# Patient Record
Sex: FEMALE | Race: WHITE | NOT HISPANIC OR LATINO | Employment: FULL TIME
[De-identification: names, ages, dates, MRNs, and addresses within clinical notes are randomized per-mention and may not be internally consistent; named-entity substitution may affect disease eponyms.]

---

## 2017-08-26 ENCOUNTER — HEALTH MAINTENANCE LETTER (OUTPATIENT)
Age: 27
End: 2017-08-26

## 2019-02-04 ENCOUNTER — RECORDS - HEALTHEAST (OUTPATIENT)
Dept: ADMINISTRATIVE | Facility: OTHER | Age: 29
End: 2019-02-04

## 2021-06-02 VITALS — WEIGHT: 160 LBS | BODY MASS INDEX: 25.82 KG/M2

## 2021-06-02 VITALS — BODY MASS INDEX: 25.82 KG/M2 | HEIGHT: 66 IN

## 2022-02-14 ENCOUNTER — APPOINTMENT (OUTPATIENT)
Dept: ULTRASOUND IMAGING | Facility: CLINIC | Age: 32
End: 2022-02-14
Attending: EMERGENCY MEDICINE
Payer: COMMERCIAL

## 2022-02-14 ENCOUNTER — HOSPITAL ENCOUNTER (EMERGENCY)
Facility: CLINIC | Age: 32
Discharge: HOME | End: 2022-02-14
Attending: EMERGENCY MEDICINE | Admitting: EMERGENCY MEDICINE
Payer: COMMERCIAL

## 2022-02-14 VITALS
WEIGHT: 170 LBS | RESPIRATION RATE: 16 BRPM | TEMPERATURE: 98.1 F | HEIGHT: 66 IN | SYSTOLIC BLOOD PRESSURE: 112 MMHG | DIASTOLIC BLOOD PRESSURE: 61 MMHG | OXYGEN SATURATION: 100 % | HEART RATE: 85 BPM | BODY MASS INDEX: 27.32 KG/M2

## 2022-02-14 DIAGNOSIS — O26.859 SPOTTING IN EARLY PREGNANCY: ICD-10-CM

## 2022-02-14 DIAGNOSIS — N39.0 URINARY TRACT INFECTION WITHOUT HEMATURIA, SITE UNSPECIFIED: ICD-10-CM

## 2022-02-14 LAB
ABO/RH(D): NORMAL
ALBUMIN SERPL-MCNC: 4 G/DL (ref 3.5–5)
ALBUMIN UR-MCNC: NEGATIVE MG/DL
ALP SERPL-CCNC: 54 U/L (ref 45–120)
ALT SERPL W P-5'-P-CCNC: 12 U/L (ref 0–45)
ANION GAP SERPL CALCULATED.3IONS-SCNC: 7 MMOL/L (ref 5–18)
APPEARANCE UR: CLEAR
AST SERPL W P-5'-P-CCNC: 14 U/L (ref 0–40)
BACTERIA #/AREA URNS HPF: ABNORMAL /HPF
BILIRUB DIRECT SERPL-MCNC: 0.1 MG/DL
BILIRUB SERPL-MCNC: 0.4 MG/DL (ref 0–1)
BILIRUB UR QL STRIP: NEGATIVE
BUN SERPL-MCNC: 3 MG/DL (ref 8–22)
CALCIUM SERPL-MCNC: 8.8 MG/DL (ref 8.5–10.5)
CHLORIDE BLD-SCNC: 107 MMOL/L (ref 98–107)
CO2 SERPL-SCNC: 22 MMOL/L (ref 22–31)
COLOR UR AUTO: ABNORMAL
CREAT SERPL-MCNC: 0.6 MG/DL (ref 0.6–1.1)
ERYTHROCYTE [DISTWIDTH] IN BLOOD BY AUTOMATED COUNT: 12.8 % (ref 10–15)
GFR SERPL CREATININE-BSD FRML MDRD: >90 ML/MIN/1.73M2
GLUCOSE BLD-MCNC: 84 MG/DL (ref 70–125)
GLUCOSE UR STRIP-MCNC: NEGATIVE MG/DL
HCG SERPL-ACNC: ABNORMAL MLU/ML (ref 0–4)
HCT VFR BLD AUTO: 36.7 % (ref 35–47)
HGB BLD-MCNC: 12.5 G/DL (ref 11.7–15.7)
HGB UR QL STRIP: ABNORMAL
KETONES UR STRIP-MCNC: NEGATIVE MG/DL
LEUKOCYTE ESTERASE UR QL STRIP: ABNORMAL
LIPASE SERPL-CCNC: 11 U/L (ref 0–52)
MCH RBC QN AUTO: 29.2 PG (ref 26.5–33)
MCHC RBC AUTO-ENTMCNC: 34.1 G/DL (ref 31.5–36.5)
MCV RBC AUTO: 86 FL (ref 78–100)
MUCOUS THREADS #/AREA URNS LPF: PRESENT /LPF
NITRATE UR QL: NEGATIVE
PH UR STRIP: 7 [PH] (ref 5–7)
PLATELET # BLD AUTO: 227 10E3/UL (ref 150–450)
POTASSIUM BLD-SCNC: 3.8 MMOL/L (ref 3.5–5)
PROT SERPL-MCNC: 7.6 G/DL (ref 6–8)
RBC # BLD AUTO: 4.28 10E6/UL (ref 3.8–5.2)
RBC URINE: 1 /HPF
SODIUM SERPL-SCNC: 136 MMOL/L (ref 136–145)
SP GR UR STRIP: 1.01 (ref 1–1.03)
SPECIMEN EXPIRATION DATE: NORMAL
SQUAMOUS EPITHELIAL: 6 /HPF
UROBILINOGEN UR STRIP-MCNC: 2 MG/DL
WBC # BLD AUTO: 8 10E3/UL (ref 4–11)
WBC URINE: 10 /HPF

## 2022-02-14 PROCEDURE — 86901 BLOOD TYPING SEROLOGIC RH(D): CPT | Performed by: EMERGENCY MEDICINE

## 2022-02-14 PROCEDURE — 83690 ASSAY OF LIPASE: CPT | Performed by: EMERGENCY MEDICINE

## 2022-02-14 PROCEDURE — 85027 COMPLETE CBC AUTOMATED: CPT | Performed by: EMERGENCY MEDICINE

## 2022-02-14 PROCEDURE — 84702 CHORIONIC GONADOTROPIN TEST: CPT | Performed by: EMERGENCY MEDICINE

## 2022-02-14 PROCEDURE — 81001 URINALYSIS AUTO W/SCOPE: CPT | Performed by: EMERGENCY MEDICINE

## 2022-02-14 PROCEDURE — 99284 EMERGENCY DEPT VISIT MOD MDM: CPT | Mod: 25

## 2022-02-14 PROCEDURE — 80053 COMPREHEN METABOLIC PANEL: CPT | Performed by: EMERGENCY MEDICINE

## 2022-02-14 PROCEDURE — 76801 OB US < 14 WKS SINGLE FETUS: CPT

## 2022-02-14 PROCEDURE — 36415 COLL VENOUS BLD VENIPUNCTURE: CPT | Performed by: EMERGENCY MEDICINE

## 2022-02-14 PROCEDURE — 82248 BILIRUBIN DIRECT: CPT | Performed by: EMERGENCY MEDICINE

## 2022-02-14 RX ORDER — NITROFURANTOIN 25; 75 MG/1; MG/1
100 CAPSULE ORAL 2 TIMES DAILY
Qty: 14 CAPSULE | Refills: 0 | Status: ON HOLD | OUTPATIENT
Start: 2022-02-14 | End: 2022-06-10

## 2022-02-14 ASSESSMENT — ENCOUNTER SYMPTOMS
DIZZINESS: 0
ABDOMINAL PAIN: 1
JOINT SWELLING: 0
VOMITING: 0
SHORTNESS OF BREATH: 0
CONFUSION: 0
FEVER: 0
SORE THROAT: 0
CHILLS: 0
DYSURIA: 0
FLANK PAIN: 1
HEMATURIA: 0
NAUSEA: 0
DIARRHEA: 0
APPETITE CHANGE: 0

## 2022-02-14 ASSESSMENT — MIFFLIN-ST. JEOR: SCORE: 1507.86

## 2022-02-14 NOTE — ED TRIAGE NOTES
The patient presents to the ED with light vaginal bleeding and pressure in her left flank that radiates around to her front. The patient is 11 weeks pregnant. The patient is . The patient reports a healthy pregnancy up until this point.

## 2022-02-14 NOTE — ED PROVIDER NOTES
Emergency Department Encounter     Evaluation Date & Time:   2022  5:06 PM    CHIEF COMPLAINT:  Vaginal Bleeding      Triage Note:The patient presents to the ED with light vaginal bleeding and pressure in her left flank that radiates around to her front. The patient is 11 weeks pregnant. The patient is . The patient reports a healthy pregnancy up until this point.           ED COURSE & MEDICAL DECISION MAKING:     ED Course as of 22   Mon 2022   1824 Rh+, no indication for rhogam.  Serum labs unremarkable.  UA with possible infection, would start on antibx given pregnant state.  Do not feel this is pyelonephritis.     Pt is  with 2 previous ectopic pregnancies and 1 miscarriage, here with brief, light pink vaginal spotting that she woke up to today. No ongoing bleeding. Does report some recent intermittent right flank/RUQ pain, but none currently and denies any dysuria, hematuria/frequency. She is afebrile, well appearing, but a little tender right flank.  US already performed and shows normal IUP.  Will get labs and anticipte discharge with follow up with her obgyn Wednesday, which she already has scheduled. Pt agreeable.      5:11 PM I met with the patient, obtained history, performed an initial exam, and discussed options and plan for diagnostics and treatment here in the ED. PPE worn including N95 mask, surgical gloves.  6:40 PM I rechecked the patient and updated her on imaging and lab results. Discussed plans for discharge.    At the conclusion of the encounter I discussed the results of all the tests and the disposition. The questions were answered. The patient or family acknowledged understanding and was agreeable with the care plan.      MEDICATIONS GIVEN IN THE EMERGENCY DEPARTMENT:  Medications - No data to display    NEW PRESCRIPTIONS STARTED AT TODAY'S ED VISIT:  Discharge Medication List as of 2022  6:42 PM      START taking these medications    Details    nitroFURantoin macrocrystal-monohydrate (MACROBID) 100 MG capsule Take 1 capsule (100 mg) by mouth 2 times daily, Disp-14 capsule, R-0, Local Print             HPI   History obtained from: Patient      Marine Andrews is a 32 year old female with a pertinent history of currently 11 weeks pregnant, previous miscarriage (x1), previous ectopic pregnancies (x2) who presents to this ED by walk in for evaluation of vaginal bleeding, flank pain. Patient reports waking up this morning and noticing bright pink blood on her drawers, so she put on a pad. Some time after she noticed her vaginal bleeding she reports developing right flank pressure which wraps around to her left abdomen. This pain has since resolved. Her pain is not provoked with eating. Patient reports she is eating and drinking normally.    Denies dysuria, hematuria, vomiting, fever, cough, or any other symptoms at this time. No concern for STD exposure.    Per chart review, patient had an OB Ultrasound on 01/26/2022 which found a normal intrauterine viable pregnancy at 8 weeks.    REVIEW OF SYSTEMS:  Review of Systems   Constitutional: Negative for appetite change, chills and fever.   HENT: Negative for sore throat.    Eyes: Negative for visual disturbance.   Respiratory: Negative for shortness of breath.    Cardiovascular: Negative for chest pain.   Gastrointestinal: Positive for abdominal pain (right sided, resolved). Negative for diarrhea, nausea and vomiting.   Endocrine: Negative for polyuria.   Genitourinary: Positive for flank pain (right sided, resolved) and vaginal bleeding (light). Negative for dysuria and hematuria.        - urinary changes     Musculoskeletal: Negative for joint swelling.   Skin: Negative for rash.   Neurological: Negative for dizziness.   Psychiatric/Behavioral: Negative for confusion.   All other systems reviewed and are negative.        Medical History   No past medical history on file.    Past Surgical History:   Procedure  "Laterality Date     TUBAL LIGATION         Family History   Problem Relation Age of Onset     Family History Negative Mother      Family History Negative Father      Family History Negative Maternal Grandmother      Family History Negative Maternal Grandfather      Cancer Paternal Grandmother         Esophageal cancer     Family History Negative Paternal Grandfather      Ulcers Mother      Ulcers Maternal Grandmother        Social History     Tobacco Use     Smoking status: Never Smoker     Smokeless tobacco: Not on file     Tobacco comment: dad smokes   Substance Use Topics     Alcohol use: No     Drug use: No       nitroFURantoin macrocrystal-monohydrate (MACROBID) 100 MG capsule  NO ACTIVE MEDICATIONS        Physical Exam     Vitals:  /61   Pulse 85   Temp 98.1  F (36.7  C) (Temporal)   Resp 16   Ht 1.676 m (5' 6\")   Wt 77.1 kg (170 lb)   LMP 11/25/2021   SpO2 100%   BMI 27.44 kg/m      PHYSICAL EXAM:   Physical Exam  Vitals and nursing note reviewed.   Constitutional:       General: She is not in acute distress.     Appearance: Normal appearance.   HENT:      Head: Normocephalic and atraumatic.      Nose: Nose normal.      Mouth/Throat:      Mouth: Mucous membranes are moist.   Eyes:      Pupils: Pupils are equal, round, and reactive to light.   Cardiovascular:      Rate and Rhythm: Normal rate and regular rhythm.      Pulses: Normal pulses.           Radial pulses are 2+ on the right side and 2+ on the left side.        Dorsalis pedis pulses are 2+ on the right side and 2+ on the left side.   Pulmonary:      Effort: Pulmonary effort is normal. No respiratory distress.      Breath sounds: Normal breath sounds.   Abdominal:      Palpations: Abdomen is soft.      Tenderness: There is no abdominal tenderness. Negative signs include McBurney's sign.      Comments: Tenderness to right flank, no rash.   Musculoskeletal:      Cervical back: Full passive range of motion without pain and neck supple.      " Comments: No calf tenderness or swelling b/l. No spinal tenderness.   Skin:     General: Skin is warm.      Findings: No rash.   Neurological:      General: No focal deficit present.      Mental Status: She is alert. Mental status is at baseline.      Comments: Fluent speech, no acute lateralizing deficits   Psychiatric:         Mood and Affect: Mood normal.         Behavior: Behavior normal.         Results     LAB:  All pertinent labs reviewed and interpreted  Labs Ordered and Resulted from Time of ED Arrival to Time of ED Departure   HCG QUANTITATIVE PREGNANCY - Abnormal       Result Value    hCG Quantitative 75,259 (*)    ROUTINE UA WITH MICROSCOPIC REFLEX TO CULTURE - Abnormal    Color Urine Light Yellow      Appearance Urine Clear      Glucose Urine Negative      Bilirubin Urine Negative      Ketones Urine Negative      Specific Gravity Urine 1.013      Blood Urine 0.5 mg/dL (*)     pH Urine 7.0      Protein Albumin Urine Negative      Urobilinogen Urine 2.0 (*)     Nitrite Urine Negative      Leukocyte Esterase Urine 75 Olga/uL (*)     Bacteria Urine Moderate (*)     Mucus Urine Present (*)     RBC Urine 1      WBC Urine 10 (*)     Squamous Epithelials Urine 6 (*)    BASIC METABOLIC PANEL - Abnormal    Sodium 136      Potassium 3.8      Chloride 107      Carbon Dioxide (CO2) 22      Anion Gap 7      Urea Nitrogen 3 (*)     Creatinine 0.60      Calcium 8.8      Glucose 84      GFR Estimate >90     CBC WITH PLATELETS - Normal    WBC Count 8.0      RBC Count 4.28      Hemoglobin 12.5      Hematocrit 36.7      MCV 86      MCH 29.2      MCHC 34.1      RDW 12.8      Platelet Count 227     HEPATIC FUNCTION PANEL - Normal    Bilirubin Total 0.4      Bilirubin Direct 0.1      Protein Total 7.6      Albumin 4.0      Alkaline Phosphatase 54      AST 14      ALT 12     LIPASE - Normal    Lipase 11     ABO AND RH    ABO/RH(D) O POS      SPECIMEN EXPIRATION DATE [id number]         RADIOLOGY:  US OB < 14 Weeks Single    Final Result   IMPRESSION:       1.  Single living intrauterine gestation at 10 weeks and 6 days, EDC 09/06/2022.      2.  Mild nonspecific free fluid.      3.  Otherwise, no obvious findings to explain symptoms.                         ECG:  none    PROCEDURES:  Procedures:  none      FINAL IMPRESSION:    ICD-10-CM    1. Spotting in early pregnancy  O26.859    2. Urinary tract infection without hematuria, site unspecified  N39.0        0 minutes of critical care time      I, Atul Sarmiento, am serving as a scribe to document services personally performed by Dr. Terence Cruz, based on my observations and the provider's statements to me. I, Terence Cruz, DO attest that Atul Sarmiento is acting in a scribe capacity, has observed my performance of the services and has documented them in accordance with my direction.      Terence Cruz DO  Emergency Medicine  [facility] EMERGENCY ROOM  2/14/2022  5:17 PM        Terence Cruz MD  02/14/22 1942

## 2022-02-15 NOTE — DISCHARGE INSTRUCTIONS
Take antibiotic as directed until gone and follow up with obgyn this Wednesday as scheduled. Return for new/worsening concerns.

## 2022-06-10 ENCOUNTER — HOSPITAL ENCOUNTER (OUTPATIENT)
Facility: CLINIC | Age: 32
End: 2022-06-10
Admitting: FAMILY MEDICINE
Payer: COMMERCIAL

## 2022-06-10 ENCOUNTER — HOSPITAL ENCOUNTER (OUTPATIENT)
Facility: CLINIC | Age: 32
Discharge: HOME | End: 2022-06-10
Attending: FAMILY MEDICINE | Admitting: FAMILY MEDICINE
Payer: COMMERCIAL

## 2022-06-10 VITALS
DIASTOLIC BLOOD PRESSURE: 89 MMHG | SYSTOLIC BLOOD PRESSURE: 133 MMHG | TEMPERATURE: 97.9 F | RESPIRATION RATE: 16 BRPM | HEART RATE: 65 BPM

## 2022-06-10 DIAGNOSIS — N30.00 ACUTE CYSTITIS WITHOUT HEMATURIA: Primary | ICD-10-CM

## 2022-06-10 PROBLEM — Z36.89 ENCOUNTER FOR TRIAGE IN PREGNANT PATIENT: Status: ACTIVE | Noted: 2022-06-10

## 2022-06-10 PROCEDURE — G0463 HOSPITAL OUTPT CLINIC VISIT: HCPCS

## 2022-06-10 RX ORDER — VITAMIN A ACETATE, .BETA.-CAROTENE, ASCORBIC ACID, CHOLECALCIFEROL, .ALPHA.-TOCOPHEROL ACETATE, DL-, THIAMINE MONONITRATE, RIBOFLAVIN, NIACINAMIDE, PYRIDOXINE HYDROCHLORIDE, FOLIC ACID, CYANOCOBALAMIN, CALCIUM CARBONATE, FERROUS FUMARATE, ZINC OXIDE, AND CUPRIC OXIDE 2000; 2000; 120; 400; 22; 1.84; 3; 20; 10; 1; 12; 200; 27; 25; 2 [IU]/1; [IU]/1; MG/1; [IU]/1; MG/1; MG/1; MG/1; MG/1; MG/1; MG/1; UG/1; MG/1; MG/1; MG/1; MG/1
1 TABLET ORAL DAILY
COMMUNITY
Start: 2022-01-19 | End: 2024-07-05

## 2022-06-10 RX ORDER — LIDOCAINE 40 MG/G
CREAM TOPICAL
Status: DISCONTINUED | OUTPATIENT
Start: 2022-06-10 | End: 2022-06-10 | Stop reason: HOSPADM

## 2022-06-10 RX ORDER — NITROFURANTOIN 25; 75 MG/1; MG/1
100 CAPSULE ORAL
Status: ON HOLD | COMMUNITY
Start: 2022-06-09 | End: 2022-06-10

## 2022-06-10 RX ORDER — IBUPROFEN 600 MG/1
600 TABLET, FILM COATED ORAL EVERY 8 HOURS PRN
Qty: 21 TABLET | Refills: 0 | Status: SHIPPED | OUTPATIENT
Start: 2022-06-10 | End: 2022-06-17

## 2022-06-10 NOTE — PROGRESS NOTES
"OB Triage Note      Assessment and Plan:     Marine Andrews is a 32 year old  at with a UTI and back pain, not in labor. Membranes are intact.  Seen yesterday, 2022, and found to have a urinalysis positive for UTI.  Had negative wet prep and chlamydia at that time.  She was prescribed Macrobid but has not yet picked it up nor taken any doses yet. No red flag or systemic symptoms. Believe this is unlikely to be nephrolithiasis or pyelonephritis. Likely UTI and musculoskeletal pain, however will increase antibiotic coverage for pyelo and have patient follow up with PCP.     Patient Active Problem List   Diagnosis     Proteinuria     Allergic rhinitis     Benign neoplasm of skin     Encounter for triage in pregnant patient     Acute cystitis without hematuria     Discharge to home. Letter for work provided  Augmentin BID x 7 days  Ibuprofen TID PRN x 7 days  Encourage fluids and rest  Follow up with PCP  In 7 days    Patient discussed with attending physician, Dr. Graham Jade , who agrees with the plan.      Aleksandr Cazares DO PGY1 6/10/2022  [facility]       Subjective:     Marine Andrews is a  32 year old female at 28 weeks with a current uncomplicated prenatal history who presents to OB triage with urinary frequency and flank pain.  Initially presented yesterday 2022 to [facility] in [place] for similar symptoms.  At that time had positive urinalysis, negative chlamydia, negative wet prep.  She was prescribed Macrobid, however did not  her prescription.  Today she notes that her flank pain is now \"constant\" as before it has been intermittent.  She thinks that this is likely due to her sleeping on her sister's couch overnight.  She does note that she has had some urinary frequency, however denies dysuria or hematuria.  She has a history of kidney stones however states that today her symptoms are nothing like that.     Marine FISCHER " "Darryl is a patient of Kay Worthy from [facility].     She denies contractions. She denies fluid leakage. She denies bleeding per vagina. Fetal movement is normal.  Estimated Date of Delivery: KIM 9/8/22 Patient's last menstrual period was 11/25/2021.     Her prenatal course has been uncomplicated.    Prenatal labs:   Lab Results   Component Value Date    GCPCRT Negative 08/14/2018    HGB 12.5 02/14/2022          Review of Systems:   CONSTITUTIONAL: no fatigue, no unexpected change in weight.  No fevers or chills  SKIN: no worrisome rashes or lesions  EYES: no acute vision problems or changes  ENT: no ear problems, no mouth problems, no throat problems  RESP: no significant cough, no shortness of breath  CV: no chest pain, no palpitations, no new or worsening peripheral edema  GI: no nausea, no vomiting, no constipation, no diarrhea  : Positive for urinary frequency, no dysuria, no hematuria  NEURO: no weakness, no dizziness, no headaches  ENDOCRINE: no temperature intolerance, no skin/hair changes  PSYCHIATRIC: NEGATIVE for changes in mood or trouble with sleep         Physical Exam:   Vitals:   /89   Pulse 65   Temp 97.9  F (36.6  C) (Oral)   Resp 16   LMP 11/25/2021   0 lbs 0 oz  Estimated body mass index is 27.44 kg/m  as calculated from the following:    Height as of 2/14/22: 1.676 m (5' 6\").    Weight as of 2/14/22: 77.1 kg (170 lb).    GEN: Awake, alert in no apparent distress   HEENT: grossly normal  NECK: no lymphadenopathy or thryoidomegaly  RESPIRATORY: clear to auscultation bilaterally, no increased work of breathing  BACK:  Mild right sided costovertebral angle tenderness  CARDIOVASCULAR: RRR, no murmur  ABDOMEN: gravid. Normal BS. No tenderness to palpation  EXT:  no edema or calf tenderness    NST interpretation:  Baseline rate 140 normal  Accelerations not present  Decelerations not present  Interpretation: reactive    Labs today:  No results found for any visits on 06/10/22.    "

## 2022-06-10 NOTE — DISCHARGE INSTRUCTIONS
It was a pleasure taking care of you today. You were seen at [facility] for your urinary tract infection. I have changed your previous antibiotic for wider coverage and prescribed some ibuprofen for your pain. Remember to only take the ibuprofen over the next week. Please follow up with your PCP within one week.

## 2022-06-10 NOTE — PROGRESS NOTES
Pt presents to triage with c/o right flank pain. Pt was seen in the clinic yesterday and given antibiotics for UTI, pt has not filled antibiotic yet. Resident Dr. Cazares notified. Orders received to discharge home with new antibiotic prescription.

## 2022-06-10 NOTE — LETTER
Susanne 10, 2022      Marine Andrews  [address]  [place] [address]              To whom it may concern:    She was seen at [facility] on 6/10/22. 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           Sincerely,      Aleksandr Cazares DO

## 2023-03-08 ENCOUNTER — APPOINTMENT (OUTPATIENT)
Dept: ULTRASOUND IMAGING | Facility: CLINIC | Age: 33
End: 2023-03-08
Payer: COMMERCIAL

## 2023-03-08 ENCOUNTER — HOSPITAL ENCOUNTER (EMERGENCY)
Facility: CLINIC | Age: 33
Discharge: HOME | End: 2023-03-08
Attending: STUDENT IN AN ORGANIZED HEALTH CARE EDUCATION/TRAINING PROGRAM | Admitting: STUDENT IN AN ORGANIZED HEALTH CARE EDUCATION/TRAINING PROGRAM
Payer: COMMERCIAL

## 2023-03-08 VITALS
TEMPERATURE: 98.2 F | BODY MASS INDEX: 32.3 KG/M2 | DIASTOLIC BLOOD PRESSURE: 87 MMHG | OXYGEN SATURATION: 100 % | HEIGHT: 66 IN | WEIGHT: 201 LBS | SYSTOLIC BLOOD PRESSURE: 151 MMHG | HEART RATE: 86 BPM | RESPIRATION RATE: 16 BRPM

## 2023-03-08 DIAGNOSIS — H81.10 BPPV (BENIGN PAROXYSMAL POSITIONAL VERTIGO): ICD-10-CM

## 2023-03-08 LAB
ANION GAP SERPL CALCULATED.3IONS-SCNC: 12 MMOL/L (ref 5–18)
ATRIAL RATE - MUSE: 80 BPM
BUN SERPL-MCNC: 5 MG/DL (ref 8–22)
CALCIUM SERPL-MCNC: 9.2 MG/DL (ref 8.5–10.5)
CHLORIDE BLD-SCNC: 105 MMOL/L (ref 98–107)
CO2 SERPL-SCNC: 21 MMOL/L (ref 22–31)
CREAT SERPL-MCNC: 0.69 MG/DL (ref 0.6–1.1)
DIASTOLIC BLOOD PRESSURE - MUSE: NORMAL MMHG
ERYTHROCYTE [DISTWIDTH] IN BLOOD BY AUTOMATED COUNT: 12.9 % (ref 10–15)
GFR SERPL CREATININE-BSD FRML MDRD: >90 ML/MIN/1.73M2
GLUCOSE BLD-MCNC: 82 MG/DL (ref 70–125)
GLUCOSE BLDC GLUCOMTR-MCNC: 89 MG/DL (ref 70–99)
HCT VFR BLD AUTO: 41.3 % (ref 35–47)
HGB BLD-MCNC: 14 G/DL (ref 11.7–15.7)
INTERPRETATION ECG - MUSE: NORMAL
MCH RBC QN AUTO: 28.5 PG (ref 26.5–33)
MCHC RBC AUTO-ENTMCNC: 33.9 G/DL (ref 31.5–36.5)
MCV RBC AUTO: 84 FL (ref 78–100)
P AXIS - MUSE: 67 DEGREES
PLATELET # BLD AUTO: 292 10E3/UL (ref 150–450)
POTASSIUM BLD-SCNC: 4 MMOL/L (ref 3.5–5)
PR INTERVAL - MUSE: 138 MS
QRS DURATION - MUSE: 74 MS
QT - MUSE: 358 MS
QTC - MUSE: 412 MS
R AXIS - MUSE: 56 DEGREES
RBC # BLD AUTO: 4.92 10E6/UL (ref 3.8–5.2)
SODIUM SERPL-SCNC: 138 MMOL/L (ref 136–145)
SYSTOLIC BLOOD PRESSURE - MUSE: NORMAL MMHG
T AXIS - MUSE: 49 DEGREES
VENTRICULAR RATE- MUSE: 80 BPM
WBC # BLD AUTO: 6.7 10E3/UL (ref 4–11)

## 2023-03-08 PROCEDURE — 96360 HYDRATION IV INFUSION INIT: CPT

## 2023-03-08 PROCEDURE — 93005 ELECTROCARDIOGRAM TRACING: CPT | Performed by: STUDENT IN AN ORGANIZED HEALTH CARE EDUCATION/TRAINING PROGRAM

## 2023-03-08 PROCEDURE — 82962 GLUCOSE BLOOD TEST: CPT

## 2023-03-08 PROCEDURE — 250N000013 HC RX MED GY IP 250 OP 250 PS 637: Performed by: STUDENT IN AN ORGANIZED HEALTH CARE EDUCATION/TRAINING PROGRAM

## 2023-03-08 PROCEDURE — 85027 COMPLETE CBC AUTOMATED: CPT

## 2023-03-08 PROCEDURE — 99285 EMERGENCY DEPT VISIT HI MDM: CPT | Mod: 25

## 2023-03-08 PROCEDURE — 82310 ASSAY OF CALCIUM: CPT

## 2023-03-08 PROCEDURE — 258N000003 HC RX IP 258 OP 636

## 2023-03-08 PROCEDURE — 36415 COLL VENOUS BLD VENIPUNCTURE: CPT

## 2023-03-08 PROCEDURE — 93971 EXTREMITY STUDY: CPT | Mod: RT

## 2023-03-08 RX ORDER — ONDANSETRON 4 MG/1
4 TABLET, ORALLY DISINTEGRATING ORAL EVERY 8 HOURS PRN
Qty: 10 TABLET | Refills: 0 | Status: SHIPPED | OUTPATIENT
Start: 2023-03-08 | End: 2023-03-11

## 2023-03-08 RX ORDER — MECLIZINE HCL 25MG 25 MG/1
25 TABLET, CHEWABLE ORAL ONCE
Status: DISCONTINUED | OUTPATIENT
Start: 2023-03-08 | End: 2023-03-08

## 2023-03-08 RX ORDER — MECLIZINE HYDROCHLORIDE 25 MG/1
25 TABLET ORAL 3 TIMES DAILY PRN
Qty: 10 TABLET | Refills: 0 | Status: SHIPPED | OUTPATIENT
Start: 2023-03-08 | End: 2024-07-05

## 2023-03-08 RX ORDER — MECLIZINE HYDROCHLORIDE 25 MG/1
25 TABLET ORAL ONCE
Status: COMPLETED | OUTPATIENT
Start: 2023-03-08 | End: 2023-03-08

## 2023-03-08 RX ADMIN — SODIUM CHLORIDE 1000 ML: 9 INJECTION, SOLUTION INTRAVENOUS at 20:35

## 2023-03-08 RX ADMIN — MECLIZINE HYDROCHLORIDE 25 MG: 25 TABLET ORAL at 20:39

## 2023-03-08 ASSESSMENT — ACTIVITIES OF DAILY LIVING (ADL): ADLS_ACUITY_SCORE: 33

## 2023-03-08 NOTE — Clinical Note
Marine Andrews was seen and treated in our emergency department on 3/8/2023.  She may return to work on 03/10/2023.       If you have any questions or concerns, please don't hesitate to call.      Ohl, Alberto Faulkner, DO

## 2023-03-09 NOTE — ED TRIAGE NOTES
1 week ago developed discomfort in her left upper arm where she has an implant for birth control.  She also began to have episodes of intermittent dizziness which improves when she is lying down.   The past 2 days has developed right calf pain with walking.     Triage Assessment     Row Name 03/08/23 5388       Triage Assessment (Adult)    Airway WDL WDL       Respiratory WDL    Respiratory WDL WDL       Skin Circulation/Temperature WDL    Skin Circulation/Temperature WDL WDL       Cardiac WDL    Cardiac WDL WDL       Peripheral/Neurovascular WDL    Peripheral Neurovascular WDL WDL       Cognitive/Neuro/Behavioral WDL    Cognitive/Neuro/Behavioral WDL WDL

## 2023-03-09 NOTE — DISCHARGE INSTRUCTIONS
Your symptoms are likely related to vertigo as we talked about. This should improve with time. Follow up with your primary clinic on Friday as planned. Please return to the ED if your symptoms worsen.

## 2023-03-09 NOTE — ED PROVIDER NOTES
Emergency Department Encounter         FINAL IMPRESSION:  dizziness        ED COURSE AND MEDICAL DECISION MAKING   9:50 PM I met with patient for initial encounter.    ED Course as of 03/08/23 2140   Wed Mar 08, 2023   2102 Patient is a 33-year-old female here with multiple symptoms for approximate 1 week.  States at work she begins to feel increasingly dizzy with standing including nausea.  No vomiting.  States at times she has ringing in the ears and describes it as intermittent possibly room spinning sensation.  No recent infections fevers or chills.  No asymmetric weakness.  No asymmetry.  No ascending weakness.  States that symptoms definitely get worse at work while standing.  Intermittent right calf pain.  States at times the symptoms get worse with eye movement.  Denies any chest pain or trouble breathing.  On arrival here her vitals are stable.  She looks well clinically.  Neurologically grossly intact with reproducible symptoms with patient's eye movements and extraocular eye motion.  No focal neurodeficits.  Ultrasound negative.   2133 Neurologic examination nonfocal. No dysmetria. No difficulty with ambulation. Exam of patients upper arm is normal with no signs of infection of birth control complication. Patient feeling better after fluids, meclizine. Ultrasound negative. Patient will be seen Friday by her PCP. Given meclizine and zofran for home.                  Medical Decision Making    History:    Supplemental history from: Documented in chart, if applicable    External Record(s) reviewed: Documented in chart, if applicable.    Work Up:    Chart documentation includes differential considered and any EKGs or imaging independently interpreted by provider, where specified.    In additional to work up documented, I considered the following work up: Documented in chart, if applicable.    External consultation:    Discussion of management with another provider: Documented in chart, if  applicable    Complicating factors:    Care impacted by chronic illness: N/A    Care affected by social determinants of health: N/A    Disposition considerations: Discharge. I prescribed additional prescription strength medication(s) as charted. See documentation for any additional details.                    Critical Care     Performed by: Alberto Villa or    Authorized by: Alberto Villa  Total critical care time:  minutes  Critical care was necessary to treat or prevent imminent or life-threatening deterioration of the following conditions:   Critical care was time spent personally by me on the following activities: development of treatment plan with patient or surrogate, discussions with consultants, examination of patient, evaluation of patient's response to treatment, obtaining history from patient or surrogate, ordering and performing treatments and interventions, ordering and review of laboratory studies, ordering and review of radiographic studies, re-evaluation of patient's condition and monitoring for potential decompensation.  Critical care time was exclusive of separately billable procedures and treating other patients.'    At the conclusion of the encounter I discussed the results of all the tests and the disposition. The questions were answered. The patient or family acknowledged understanding and was agreeable with the care plan.                  MEDICATIONS GIVEN IN THE EMERGENCY DEPARTMENT:  Medications   0.9% sodium chloride BOLUS (1,000 mLs Intravenous $New Bag 3/8/23 2035)   meclizine (ANTIVERT) tablet 25 mg (25 mg Oral $Given 3/8/23 2039)       NEW PRESCRIPTIONS STARTED AT TODAY'S ED VISIT:  New Prescriptions    No medications on file       HPI     Patient information obtained from: Patient    Use of Interpretor: N/A    Marine Andrews is a 33 year old female with no contributory medical history who presents to this ED via walk-in for evaluation of dizziness.    Patient endorses 1 week of worsening  "dizziness with associated nausea. She notes that the dizziness is worse with movement, and describes it as a room spinning sensation with intermittent ringing in her ears. Patient denies any recent illness. She also notes some intermittent right lower leg pain.    Patient denies vomiting, chest pain, shortness of breath, weakness, and all other complaints at this time.        REVIEW OF SYSTEMS:  Review of Systems   Constitutional: Negative for fever, malaise  HEENT: Negative runny nose, sore throat, ear pain, neck pain  Respiratory: Negative for shortness of breath, cough, congestion  Cardiovascular: Negative for chest pain, leg edema  Gastrointestinal: Negative for abdominal distention, abdominal pain, constipation, vomiting, diarrhea. Positive for nausea.  Genitourinary: Negative for dysuria and hematuria.   Integument: Negative for rash, skin breakdown  Neurological: Negative for paresthesias, weakness, headache. Positive for dizziness.  Musculoskeletal: Negative for joint pain, joint swelling      All other systems reviewed and are negative.          MEDICAL HISTORY     History reviewed. No pertinent past medical history.    Past Surgical History:   Procedure Laterality Date     TUBAL LIGATION Right        Social History     Tobacco Use     Smoking status: Never     Smokeless tobacco: Never     Tobacco comments:     dad smokes   Substance Use Topics     Alcohol use: No     Drug use: No       NO ACTIVE MEDICATIONS  Prenatal Vit-Fe Fumarate-FA (PNV PRENATAL PLUS MULTIVITAMIN) 27-1 MG TABS per tablet  sertraline (ZOLOFT) 50 MG tablet            PHYSICAL EXAM     BP (!) 155/80   Pulse 86   Temp 98.2  F (36.8  C) (Oral)   Resp 16   Ht 1.676 m (5' 6\")   Wt 91.2 kg (201 lb)   SpO2 100%   BMI 32.44 kg/m        PHYSICAL EXAM:     General: Patient appears well, nontoxic, comfortable  HEENT: Moist mucous membranes,  No head trauma.  No midline neck pain. Reproducible symptoms with patient's eye movements and " extraocular eye motion.  Cardiovascular: Normal rate, normal rhythm, no extremity edema.  No appreciable murmur.  Respiratory: No signs of respiratory distress, lungs are clear to auscultation bilaterally with no wheezes rhonchi or rales.  Abdominal: Soft, nontender, nondistended, no palpable masses, no guarding, no rebound  Musculoskeletal: Full range of motion of joints, no deformities appreciated.  Neurological: Alert and oriented, +5 strength UE/LE, normal finger to nose, , gross sensation intact throughout, CN II-12 intact grossly, no difficulty with ambulation, no slurring of words, no word finding difficulty    Psychological: Normal affect and mood.  Integument: No rashes appreciated          RESULTS       Labs Ordered and Resulted from Time of ED Arrival to Time of ED Departure   BASIC METABOLIC PANEL - Abnormal       Result Value    Sodium 138      Potassium 4.0      Chloride 105      Carbon Dioxide (CO2) 21 (*)     Anion Gap 12      Urea Nitrogen 5 (*)     Creatinine 0.69      Calcium 9.2      Glucose 82      GFR Estimate >90     GLUCOSE BY METER - Normal    GLUCOSE BY METER POCT 89     CBC WITH PLATELETS - Normal    WBC Count 6.7      RBC Count 4.92      Hemoglobin 14.0      Hematocrit 41.3      MCV 84      MCH 28.5      MCHC 33.9      RDW 12.9      Platelet Count 292     GLUCOSE MONITOR NURSING POCT       US Lower Extremity Venous Duplex Right   Final Result   IMPRESSION:   1.  No deep venous thrombosis in the right lower extremity.                        PROCEDURES:  Procedures:  Procedures       I, Javier Crow am serving as a scribe to document services personally performed by Alberto Villa DO, based on my observations and the provider's statements to me.  I, Alberto Villa DO, attest that Javier Crow is acting in a scribe capacity, has observed my performance of the services and has documented them in accordance with my direction.    Alberto Villa DO  Emergency Medicine  [facility]  EMERGENCY ROOM      Ohl, Alberto Faulkner,   03/11/23 5337

## 2024-05-22 ENCOUNTER — APPOINTMENT (OUTPATIENT)
Dept: CT IMAGING | Facility: CLINIC | Age: 34
End: 2024-05-22
Attending: EMERGENCY MEDICINE
Payer: COMMERCIAL

## 2024-05-22 ENCOUNTER — HOSPITAL ENCOUNTER (EMERGENCY)
Facility: CLINIC | Age: 34
Discharge: HOME | End: 2024-05-22
Attending: EMERGENCY MEDICINE | Admitting: EMERGENCY MEDICINE
Payer: COMMERCIAL

## 2024-05-22 VITALS
RESPIRATION RATE: 19 BRPM | HEART RATE: 71 BPM | HEIGHT: 66 IN | OXYGEN SATURATION: 100 % | TEMPERATURE: 98.3 F | SYSTOLIC BLOOD PRESSURE: 157 MMHG | DIASTOLIC BLOOD PRESSURE: 103 MMHG | BODY MASS INDEX: 32.44 KG/M2

## 2024-05-22 DIAGNOSIS — S16.1XXA CERVICAL STRAIN, INITIAL ENCOUNTER: ICD-10-CM

## 2024-05-22 PROCEDURE — 250N000011 HC RX IP 250 OP 636: Performed by: EMERGENCY MEDICINE

## 2024-05-22 PROCEDURE — 96372 THER/PROPH/DIAG INJ SC/IM: CPT | Performed by: EMERGENCY MEDICINE

## 2024-05-22 PROCEDURE — 99285 EMERGENCY DEPT VISIT HI MDM: CPT | Mod: 25

## 2024-05-22 PROCEDURE — 72125 CT NECK SPINE W/O DYE: CPT

## 2024-05-22 PROCEDURE — 250N000013 HC RX MED GY IP 250 OP 250 PS 637: Performed by: EMERGENCY MEDICINE

## 2024-05-22 RX ORDER — KETOROLAC TROMETHAMINE 30 MG/ML
30 INJECTION, SOLUTION INTRAMUSCULAR; INTRAVENOUS ONCE
Status: COMPLETED | OUTPATIENT
Start: 2024-05-22 | End: 2024-05-22

## 2024-05-22 RX ORDER — DIAZEPAM 5 MG
5 TABLET ORAL ONCE
Status: COMPLETED | OUTPATIENT
Start: 2024-05-22 | End: 2024-05-22

## 2024-05-22 RX ORDER — METHYLPREDNISOLONE 4 MG
TABLET, DOSE PACK ORAL
Qty: 21 TABLET | Refills: 0 | Status: SHIPPED | OUTPATIENT
Start: 2024-05-22 | End: 2024-07-05

## 2024-05-22 RX ORDER — HYDROXYZINE HYDROCHLORIDE 25 MG/1
50 TABLET, FILM COATED ORAL EVERY 8 HOURS PRN
Qty: 20 TABLET | Refills: 0 | Status: SHIPPED | OUTPATIENT
Start: 2024-05-22 | End: 2024-07-05

## 2024-05-22 RX ORDER — MELOXICAM 7.5 MG/1
7.5 TABLET ORAL DAILY
Qty: 14 TABLET | Refills: 0 | Status: SHIPPED | OUTPATIENT
Start: 2024-05-22 | End: 2024-07-05

## 2024-05-22 RX ADMIN — KETOROLAC TROMETHAMINE 30 MG: 30 INJECTION, SOLUTION INTRAMUSCULAR at 14:33

## 2024-05-22 RX ADMIN — DIAZEPAM 5 MG: 5 TABLET ORAL at 14:32

## 2024-05-22 ASSESSMENT — COLUMBIA-SUICIDE SEVERITY RATING SCALE - C-SSRS
1. IN THE PAST MONTH, HAVE YOU WISHED YOU WERE DEAD OR WISHED YOU COULD GO TO SLEEP AND NOT WAKE UP?: NO
6. HAVE YOU EVER DONE ANYTHING, STARTED TO DO ANYTHING, OR PREPARED TO DO ANYTHING TO END YOUR LIFE?: NO
2. HAVE YOU ACTUALLY HAD ANY THOUGHTS OF KILLING YOURSELF IN THE PAST MONTH?: NO

## 2024-05-22 NOTE — Clinical Note
Marine Andrews was seen and treated in our emergency department on 5/22/2024.  She may return to work on 05/23/2024.  Recommend no overhead lifting for 1 week.     If you have any questions or concerns, please don't hesitate to call.      Ronnell Wade, DO

## 2024-05-22 NOTE — ED TRIAGE NOTES
One week history of pain in back above right scapula.  States thinks it might be a pinched nerve.  Has gotten worse and over the last 3 days, pain has started radiating into right side of neck and to right shoulder and clavicle area. States application of cold to neck helps. When pain is severe, feels slightly lightheaded. No previous history     Triage Assessment (Adult)       Row Name 05/22/24 9365          Triage Assessment    Airway WDL WDL        Respiratory WDL    Respiratory WDL WDL

## 2024-05-22 NOTE — PROGRESS NOTES
Patient discharged home with AVS and prescription for medications. Patient called for ride  in ED lobby. Vitals stable on RA. Will follow up with PCP at discharge. See MD note for assessment.

## 2024-05-22 NOTE — ED PROVIDER NOTES
EMERGENCY DEPARTMENT ENCOUNTER      NAME: Marine Andrews  AGE: 34 year old female  YOB: 1990  MRN: [id number]  EVALUATION DATE & TIME: No admission date for patient encounter.    PCP: No Ref-Primary, Physician    ED PROVIDER: Ronnell Wade D.O.      Chief Complaint   Patient presents with    Back Pain       FINAL IMPRESSION:  1. Cervical strain, initial encounter        ED COURSE & MEDICAL DECISION MAKIN:07 PM I met with the patient to gather history and to perform my initial exam. I discussed the plan for care while in the Emergency Department.  3:37 PM I rechecked patient. Patient reports the medications given here helped alleviate the pain. Plan for discharge.           Pertinent Labs & Imaging studies reviewed. (See chart for details)  34 year old female presents to the Emergency Department for evaluation of neck pain with no significant history of trauma.  There was some mild radicular symptoms.  Clinically there is no concern for fracture, but some concern for the potential for foraminal stenosis.  No evidence of be suggestive of epidural abscess, epidural hematoma, other space-occupying lesion.  Patient had no distal symptoms other than the neck and shoulder.  No bowel or bladder dysfunction or cauda equina symptoms.  CT imaging did not show evidence of acute process.  Symptoms did dramatically improve with both Valium and Toradol.  At this time I do suspect musculoskeletal etiology and believe the patient can be safely discharged home with outpatient follow-up.  Will discharge on Medrol Dosepak.  Patient was comfortable with the plan.  Return precautions were discussed.    Medical Decision Making  Obtained supplemental history:Supplemental history obtained?: No  Reviewed external records: External records reviewed?: Documented in chart  Care impacted by chronic illness:Hypertension  Care significantly affected by social determinants of health:N/A  Did you consider but not order tests?:  "Work up considered but not performed and documented in chart, if applicable  Did you interpret images independently?: Independent interpretation of ECG and images noted in documentation, when applicable.  Consultation discussion with other provider:Did you involve another provider (consultant, , pharmacy, etc.)?: No  Discharge. I prescribed additional prescription strength medication(s) as charted. See documentation for any additional details.    At the conclusion of the encounter I discussed the results of all of the tests and the disposition. The questions were answered. The patient or family acknowledged understanding and was agreeable with the care plan.        HPI    Patient information was obtained from: Patient    Use of : N/A      Marine Andrews is a 34 year old female who presents to the emergency department via walk-in for evaluation of back pain.    Patient reports right scapula pain for a week that feels like a \"pinched nerve\". States the pain has been getting worse in the last 3 days and endorses shooting pain across the scapula, to the back of the neck and into the upper chest. She denies any trauma or falls. Pain gets worse with neck movements or lifting. Patient reports that the pain also triggers her into having \"panic attacks\". She has tried taking motrin and ibuprofen without relief but icing the area helps alleviate the pain. Patient denies leg weakness or loss of bowel or bladder.       REVIEW OF SYSTEMS  Constitutional:  Denies fever, chills, weight loss or weakness  Eyes:  No pain, discharge, redness  HENT:  Denies sore throat, ear pain, congestion  Respiratory: No SOB, wheeze or cough  Cardiovascular:  No CP, palpitations  GI:  Denies abdominal pain, nausea, vomiting, diarrhea  : Denies dysuria, hematuria  Musculoskeletal:  Positive for right scapula pain.  Skin:  Denies rash, pallor  Neurologic:  Denies headache, focal weakness or sensory changes  Lymph: Denies swollen " "nodes    All other systems negative unless noted in HPI.    PAST MEDICAL HISTORY:  History reviewed. No pertinent past medical history.    PAST SURGICAL HISTORY:  Past Surgical History:   Procedure Laterality Date    TUBAL LIGATION Right          CURRENT MEDICATIONS:    No current facility-administered medications for this encounter.     Current Outpatient Medications   Medication Sig Dispense Refill    hydrOXYzine HCl (ATARAX) 25 MG tablet Take 2 tablets (50 mg) by mouth every 8 hours as needed for anxiety 20 tablet 0    meloxicam (MOBIC) 7.5 MG tablet Take 1 tablet (7.5 mg) by mouth daily 14 tablet 0    methylPREDNISolone (MEDROL DOSEPAK) 4 MG tablet therapy pack Follow Package Directions 21 tablet 0    meclizine (ANTIVERT) 25 MG tablet Take 1 tablet (25 mg) by mouth 3 times daily as needed for dizziness 10 tablet 0    NO ACTIVE MEDICATIONS .      Prenatal Vit-Fe Fumarate-FA (PNV PRENATAL PLUS MULTIVITAMIN) 27-1 MG TABS per tablet Take 1 tablet by mouth daily      sertraline (ZOLOFT) 50 MG tablet Take 50 mg by mouth daily           ALLERGIES:  Allergies   Allergen Reactions    Ceclor [Cefaclor] Rash       FAMILY HISTORY:  Family History   Problem Relation Age of Onset    Family History Negative Mother     Family History Negative Father     Family History Negative Maternal Grandmother     Family History Negative Maternal Grandfather     Cancer Paternal Grandmother         Esophageal cancer    Family History Negative Paternal Grandfather     Ulcers Mother     Ulcers Maternal Grandmother        SOCIAL HISTORY:  Social History     Socioeconomic History    Marital status: Single   Tobacco Use    Smoking status: Never    Smokeless tobacco: Never    Tobacco comments:     dad smokes   Substance and Sexual Activity    Alcohol use: No    Drug use: No    Sexual activity: Yes     Partners: Male     Comment: states \"using protection\"     Social Determinants of Health      Received from [facility] [employer] [facility] [employer] [facility] "[facility]    Financial Resource Strain    Received from [facility]    Social Connections       VITALS:  Patient Vitals for the past 24 hrs:   BP Temp Temp src Pulse Resp SpO2 Height   05/22/24 1546 (!) 157/103 -- -- 71 19 100 % --   05/22/24 1330 (!) 158/92 98.3  F (36.8  C) Temporal 79 20 99 % 1.676 m (5' 6\")       PHYSICAL EXAM    Vitals: BP (!) 157/103 (BP Location: Left arm, Patient Position: Semi-Bhakta's, Cuff Size: Adult Regular)   Pulse 71   Temp 98.3  F (36.8  C) (Temporal)   Resp 19   Ht 1.676 m (5' 6\")   SpO2 100%   BMI 32.44 kg/m    General: Appears in no acute distress, awake, alert, interactive.  Eyes: Conjunctivae non-injected. Sclera anicteric.  HENT: Atraumatic, normocephalic  Neck: Supple, normal ROM  Respiratory/Chest: Respiration unlabored, no tachypnea  Abdomen: non distended  Musculoskeletal: Normal extremities. No edema or erythema. Tenderness with palpation to the posterior lateral right C-spine from C4-C7.  Skin: Normal color. No rash or diaphoresis.   Neurologic: Alert and oriented, face symmetric, moves all extremities spontaneously. Speech clear.  Psychiatric:  Affect normal, Judgment normal, Mood normal.        LABS  Results for orders placed or performed during the hospital encounter of 05/22/24 (from the past 24 hour(s))   CT Cervical Spine w/o Contrast    Narrative    EXAM: CT CERVICAL SPINE W/O CONTRAST  LOCATION: [facility]  DATE: 5/22/2024    INDICATION: Right cervical radiculopathy  COMPARISON: None.  TECHNIQUE: Routine CT Cervical Spine without IV contrast. Multiplanar reformats. Dose reduction techniques were used.    FINDINGS:  VERTEBRA: Reversal usual cervical spine lordosis. Cervical vertebral body heights are maintained. No acute cervical spine fracture.     CANAL/FORAMINA: No canal or neural foraminal stenosis.    PARASPINAL: No extraspinal abnormality.      Impression    IMPRESSION:  1.  No acute cervical " spine fracture.         RADIOLOGY  CT Cervical Spine w/o Contrast   Final Result   IMPRESSION:   1.  No acute cervical spine fracture.               MEDICATIONS GIVEN IN THE EMERGENCY:  Medications   ketorolac (TORADOL) injection 30 mg (30 mg Intramuscular $Given 5/22/24 1433)   diazepam (VALIUM) tablet 5 mg (5 mg Oral $Given 5/22/24 1432)       NEW PRESCRIPTIONS STARTED AT TODAY'S ER VISIT  Discharge Medication List as of 5/22/2024  3:41 PM        START taking these medications    Details   hydrOXYzine HCl (ATARAX) 25 MG tablet Take 2 tablets (50 mg) by mouth every 8 hours as needed for anxiety, Disp-20 tablet, R-0, Local Print      meloxicam (MOBIC) 7.5 MG tablet Take 1 tablet (7.5 mg) by mouth daily, Disp-14 tablet, R-0, Local Print      methylPREDNISolone (MEDROL DOSEPAK) 4 MG tablet therapy pack Follow Package Directions, Disp-21 tablet, R-0, Local Print              I, Yajaira Beasley, am serving as a scribe to document services personally performed by Ronnell Wade D.O., based on my observations and the provider's statements to me.  I, Ronnell Wade D.O., attest that Yajaira Beasley is acting in a scribe capacity, has observed my performance of the services and has documented them in accordance with my direction.     Ronnell Wade D.O.  Emergency Medicine  [facility] EMERGENCY ROOM  [address] [address]  [phone]  Dept: [phone]       Ronnell Wade,   05/22/24 1742

## 2024-07-05 ENCOUNTER — OFFICE VISIT (OUTPATIENT)
Dept: FAMILY MEDICINE | Facility: CLINIC | Age: 34
End: 2024-07-05
Payer: COMMERCIAL

## 2024-07-05 VITALS
RESPIRATION RATE: 18 BRPM | SYSTOLIC BLOOD PRESSURE: 129 MMHG | TEMPERATURE: 98.2 F | OXYGEN SATURATION: 100 % | BODY MASS INDEX: 27.6 KG/M2 | WEIGHT: 171 LBS | DIASTOLIC BLOOD PRESSURE: 91 MMHG | HEART RATE: 66 BPM

## 2024-07-05 DIAGNOSIS — R07.81 RIB PAIN ON RIGHT SIDE: Primary | ICD-10-CM

## 2024-07-05 DIAGNOSIS — M54.12 CERVICAL RADICULAR PAIN: ICD-10-CM

## 2024-07-05 PROCEDURE — 99204 OFFICE O/P NEW MOD 45 MIN: CPT | Performed by: FAMILY MEDICINE

## 2024-07-05 RX ORDER — METHYLPREDNISOLONE 4 MG
TABLET, DOSE PACK ORAL
Qty: 21 TABLET | Refills: 0 | Status: SHIPPED | OUTPATIENT
Start: 2024-07-05 | End: 2024-09-14

## 2024-07-05 RX ORDER — HYDROXYZINE HYDROCHLORIDE 25 MG/1
50 TABLET, FILM COATED ORAL EVERY 8 HOURS PRN
Qty: 20 TABLET | Refills: 0 | Status: SHIPPED | OUTPATIENT
Start: 2024-07-05

## 2024-07-05 RX ORDER — CYCLOBENZAPRINE HCL 5 MG
5 TABLET ORAL 3 TIMES DAILY PRN
Qty: 30 TABLET | Refills: 0 | Status: SHIPPED | OUTPATIENT
Start: 2024-07-05

## 2024-07-05 NOTE — LETTER
July 5, 2024      Marine Andrews  [address]   [place] [address]        To Whom It May Concern:    Marine Andrews  was seen on July 5, 2024 .  Please excuse absence today. She will be reevaluated Monday.       Sincerely,        Juan Siu MD

## 2024-07-07 ENCOUNTER — HOSPITAL ENCOUNTER (EMERGENCY)
Facility: CLINIC | Age: 34
Discharge: HOME | End: 2024-07-07
Attending: STUDENT IN AN ORGANIZED HEALTH CARE EDUCATION/TRAINING PROGRAM | Admitting: STUDENT IN AN ORGANIZED HEALTH CARE EDUCATION/TRAINING PROGRAM
Payer: COMMERCIAL

## 2024-07-07 ENCOUNTER — APPOINTMENT (OUTPATIENT)
Dept: RADIOLOGY | Facility: CLINIC | Age: 34
End: 2024-07-07
Attending: STUDENT IN AN ORGANIZED HEALTH CARE EDUCATION/TRAINING PROGRAM
Payer: COMMERCIAL

## 2024-07-07 VITALS
DIASTOLIC BLOOD PRESSURE: 79 MMHG | SYSTOLIC BLOOD PRESSURE: 129 MMHG | HEART RATE: 70 BPM | OXYGEN SATURATION: 100 % | HEIGHT: 66 IN | TEMPERATURE: 97.9 F | BODY MASS INDEX: 27.47 KG/M2 | WEIGHT: 170.9 LBS | RESPIRATION RATE: 18 BRPM

## 2024-07-07 DIAGNOSIS — M94.0 COSTOCHONDRITIS: ICD-10-CM

## 2024-07-07 PROCEDURE — 93005 ELECTROCARDIOGRAM TRACING: CPT | Performed by: STUDENT IN AN ORGANIZED HEALTH CARE EDUCATION/TRAINING PROGRAM

## 2024-07-07 PROCEDURE — 93005 ELECTROCARDIOGRAM TRACING: CPT | Performed by: EMERGENCY MEDICINE

## 2024-07-07 PROCEDURE — 250N000011 HC RX IP 250 OP 636: Performed by: STUDENT IN AN ORGANIZED HEALTH CARE EDUCATION/TRAINING PROGRAM

## 2024-07-07 PROCEDURE — 96372 THER/PROPH/DIAG INJ SC/IM: CPT | Performed by: STUDENT IN AN ORGANIZED HEALTH CARE EDUCATION/TRAINING PROGRAM

## 2024-07-07 PROCEDURE — 99284 EMERGENCY DEPT VISIT MOD MDM: CPT | Mod: 25

## 2024-07-07 PROCEDURE — 250N000013 HC RX MED GY IP 250 OP 250 PS 637: Performed by: STUDENT IN AN ORGANIZED HEALTH CARE EDUCATION/TRAINING PROGRAM

## 2024-07-07 PROCEDURE — 71046 X-RAY EXAM CHEST 2 VIEWS: CPT

## 2024-07-07 RX ORDER — ACETAMINOPHEN 325 MG/1
975 TABLET ORAL ONCE
Status: COMPLETED | OUTPATIENT
Start: 2024-07-07 | End: 2024-07-07

## 2024-07-07 RX ORDER — KETOROLAC TROMETHAMINE 15 MG/ML
15 INJECTION, SOLUTION INTRAMUSCULAR; INTRAVENOUS ONCE
Status: COMPLETED | OUTPATIENT
Start: 2024-07-07 | End: 2024-07-07

## 2024-07-07 RX ADMIN — ACETAMINOPHEN 975 MG: 325 TABLET ORAL at 22:12

## 2024-07-07 RX ADMIN — KETOROLAC TROMETHAMINE 15 MG: 15 INJECTION, SOLUTION INTRAMUSCULAR; INTRAVENOUS at 22:13

## 2024-07-07 ASSESSMENT — ACTIVITIES OF DAILY LIVING (ADL)
ADLS_ACUITY_SCORE: 33
ADLS_ACUITY_SCORE: 35

## 2024-07-07 ASSESSMENT — COLUMBIA-SUICIDE SEVERITY RATING SCALE - C-SSRS
6. HAVE YOU EVER DONE ANYTHING, STARTED TO DO ANYTHING, OR PREPARED TO DO ANYTHING TO END YOUR LIFE?: YES
2. HAVE YOU ACTUALLY HAD ANY THOUGHTS OF KILLING YOURSELF IN THE PAST MONTH?: NO
1. IN THE PAST MONTH, HAVE YOU WISHED YOU WERE DEAD OR WISHED YOU COULD GO TO SLEEP AND NOT WAKE UP?: NO

## 2024-07-07 NOTE — Clinical Note
Marine Andrews was seen and treated in our emergency department on 7/7/2024.  She may return to work on 07/08/2024.  Please limit lifting to 20 pounds for the next week.     If you have any questions or concerns, please don't hesitate to call.      Ohl, Alberto Faulkner, DO

## 2024-07-08 LAB
ATRIAL RATE - MUSE: 68 BPM
DIASTOLIC BLOOD PRESSURE - MUSE: NORMAL MMHG
INTERPRETATION ECG - MUSE: NORMAL
P AXIS - MUSE: 55 DEGREES
PR INTERVAL - MUSE: 134 MS
QRS DURATION - MUSE: 76 MS
QT - MUSE: 386 MS
QTC - MUSE: 410 MS
R AXIS - MUSE: 46 DEGREES
SYSTOLIC BLOOD PRESSURE - MUSE: NORMAL MMHG
T AXIS - MUSE: 36 DEGREES
VENTRICULAR RATE- MUSE: 68 BPM

## 2024-07-08 NOTE — ED TRIAGE NOTES
Pt had a pulled muscle in neck and in chest, seen in urgent care and given meds, pt has been taking meds but no relief from chest pressure and pain and makes pt anxious     Triage Assessment (Adult)       Row Name 07/07/24 2058          Triage Assessment    Airway WDL WDL        Respiratory WDL    Respiratory WDL WDL        Skin Circulation/Temperature WDL    Skin Circulation/Temperature WDL WDL        Cardiac WDL    Cardiac WDL X;chest pain        Chest Pain Assessment    Chest Pain Location anterior chest, right        Peripheral/Neurovascular WDL    Peripheral Neurovascular WDL WDL        Cognitive/Neuro/Behavioral WDL    Cognitive/Neuro/Behavioral WDL WDL

## 2024-07-08 NOTE — ED PROVIDER NOTES
"  Emergency Department Encounter         FINAL IMPRESSION:  Costochondritis         ED COURSE AND MEDICAL DECISION MAKING       ED Course as of 07/07/24 2144   Sun Jul 07, 2024 2130 34-year-old female history of anxiety, here with chest discomfort.  States that chest discomfort has been present for last couple weeks although worsened.  States initially began as neck tightness and she is was seen by an urgent care doctor and prescribed muscle relaxants, here now with anterior chest discomfort worse with palpation and movement.  PERC negative.  No fevers chills nausea vomiting.  No cough or congestion.  No abdominal pain.  No radiation into the back or abdomen.  No dysuria or bowel movement changes.  On arrival she looks well.  On examination patient has a focal area just right lateral to the sternum that hurts to palpation with patient reporting it feels \"kind of swollen.\"  Reproducible pain there.  No skin changes.  Plan for x-ray and reevaluate.  Patient is on the Nexplanon however because it is a progestin, does not put her at risk for PE.  Otherwise PERC negative.   - cxr clear, discharged with supportive care measure    Additional ED Course Timeline:  9:44 PM I met with the patient, obtained history, performed an initial exam, and discussed options and plan for diagnostics and treatment here in the ED.                        Medical Decision Making  Obtained supplemental history:Supplemental history obtained?: No  Reviewed external records: External records reviewed?: No  Care impacted by chronic illness:N/A  Care significantly affected by social determinants of health:N/A  Did you consider but not order tests?: Work up considered but not performed and documented in chart, if applicable  Did you interpret images independently?: Independent interpretation of ECG and images noted in documentation, when applicable.  Consultation discussion with other provider:Did you involve another provider (consultant, , " pharmacy, etc.)?: No  Discharge. No recommendations on prescription strength medication(s). See documentation for any additional details.              EKG        Critical Care     Performed by: Alberto Villa or    Authorized by: Alberto Villa  Total critical care time:  minutes  Critical care was necessary to treat or prevent imminent or life-threatening deterioration of the following conditions:   Critical care was time spent personally by me on the following activities: development of treatment plan with patient or surrogate, discussions with consultants, examination of patient, evaluation of patient's response to treatment, obtaining history from patient or surrogate, ordering and performing treatments and interventions, ordering and review of laboratory studies, ordering and review of radiographic studies, re-evaluation of patient's condition and monitoring for potential decompensation.  Critical care time was exclusive of separately billable procedures and treating other patients.'    At the conclusion of the encounter I discussed the results of all the tests and the disposition. The questions were answered. The patient or family acknowledged understanding and was agreeable with the care plan.                  MEDICATIONS GIVEN IN THE EMERGENCY DEPARTMENT:  Medications - No data to display    NEW PRESCRIPTIONS STARTED AT TODAY'S ED VISIT:  New Prescriptions    No medications on file       HPI     Patient information obtained from: The patient    Use of : N/A     Marine Andrews is a 34 year old female with no pertinent history who presents to this ED via walk-in for evaluation of chest pain.    The patient reports of chest discomfort that began since the last couple of weeks and has worsened at this time. Her symptoms started off as neck tightness and was prescribed muscle relaxants after her urgent care visit earlier this week. She notes her chest discomfort worsens with palpation and movement.    Otherwise,  "the patient denied having fevers, chills, nausea, vomiting, cough, congestion, abdominal pain, back pain, changes in bladder and bowel habits, and any other medical complaints at this time.          REVIEW OF SYSTEMS:  Review of Systems   Constitutional: Negative for fever, malaise  HEENT: Negative runny nose, sore throat, ear pain, neck pain. Positive for neck tightness.  Respiratory: Negative for shortness of breath, cough, congestion  Cardiovascular: Negative for leg edema. Positive for chest discomfort.  Gastrointestinal: Negative for abdominal distention, abdominal pain, constipation, vomiting, nausea, diarrhea  Genitourinary: Negative for dysuria and hematuria.   Integument: Negative for rash, skin breakdown  Neurological: Negative for paresthesias, weakness, headache.  Musculoskeletal: Negative for joint pain, joint swelling      All other systems reviewed and are negative.          MEDICAL HISTORY     No past medical history on file.    Past Surgical History:   Procedure Laterality Date    TUBAL LIGATION Right        Social History     Tobacco Use    Smoking status: Never    Smokeless tobacco: Never    Tobacco comments:     dad smokes   Substance Use Topics    Alcohol use: No    Drug use: No       cyclobenzaprine (FLEXERIL) 5 MG tablet  hydrOXYzine HCl (ATARAX) 25 MG tablet  methylPREDNISolone (MEDROL DOSEPAK) 4 MG tablet therapy pack            PHYSICAL EXAM     BP (!) 159/103   Pulse 82   Temp 97.9  F (36.6  C) (Oral)   Resp 18   Ht 1.676 m (5' 6\")   Wt 77.5 kg (170 lb 14.4 oz)   SpO2 100%   BMI 27.58 kg/m        PHYSICAL EXAM:     General: Patient appears well, nontoxic, comfortable  HEENT: Moist mucous membranes,  No head trauma.    Cardiovascular: Normal rate, normal rhythm, no extremity edema.  No appreciable murmur.   Respiratory: No signs of respiratory distress, lungs are clear to auscultation bilaterally with no wheezes rhonchi or rales.  Abdominal: Soft, nontender, nondistended, no palpable " "masses, no guarding, no rebound  Musculoskeletal: Full range of motion of joints, no deformities appreciated. Focal area just right lateral to the sternum that hurts to palpation with patient reporting it feels \"kind of swollen.\" Reproducible pain there.   Neurological: Alert and oriented, grossly neurologically intact.  Psychological: Normal affect and mood.  Integument: No rashes appreciated          RESULTS       Labs Ordered and Resulted from Time of ED Arrival to Time of ED Departure - No data to display    Chest XR,  PA & LAT    (Results Pending)                     PROCEDURES:  Procedures:  Procedures       I, Case Capellan am serving as a scribe to document services personally performed by Alberto Villa DO, based on my observations and the provider's statements to me.  IAlberto DO, attest that Case Capellan is acting in a scribe capacity, has observed my performance of the services and has documented them in accordance with my direction.    Alebrto Villa DO  Emergency Medicine  [facility] EMERGENCY ROOM       Alberto Villa DO  07/08/24 0010    "

## 2024-07-08 NOTE — DISCHARGE INSTRUCTIONS
I suspect you have inflammation of your chest wall called costochondritis.    Please take 500mg of tylenol every 4 hours as well as 600mg of ibuprofen every 6 hours for pain. Do not take more than 4,000mg of tylenol in 24hrs.    Your chest x-ray was clear.    I would also use ice packs.  Limit lifting more than 20 pounds above your head for the next week or so.

## 2024-07-16 ENCOUNTER — OFFICE VISIT (OUTPATIENT)
Dept: FAMILY MEDICINE | Facility: CLINIC | Age: 34
End: 2024-07-16
Payer: COMMERCIAL

## 2024-07-16 VITALS
SYSTOLIC BLOOD PRESSURE: 133 MMHG | TEMPERATURE: 98.3 F | RESPIRATION RATE: 18 BRPM | OXYGEN SATURATION: 100 % | DIASTOLIC BLOOD PRESSURE: 83 MMHG | HEART RATE: 68 BPM

## 2024-07-16 DIAGNOSIS — R07.0 THROAT PAIN: Primary | ICD-10-CM

## 2024-07-16 DIAGNOSIS — M25.511 ACUTE PAIN OF RIGHT SHOULDER: ICD-10-CM

## 2024-07-16 LAB
DEPRECATED S PYO AG THROAT QL EIA: NEGATIVE
GROUP A STREP BY PCR: NOT DETECTED

## 2024-07-16 PROCEDURE — 99212 OFFICE O/P EST SF 10 MIN: CPT | Performed by: PHYSICIAN ASSISTANT

## 2024-07-16 PROCEDURE — 87651 STREP A DNA AMP PROBE: CPT | Performed by: PHYSICIAN ASSISTANT

## 2024-07-16 RX ORDER — TIZANIDINE 2 MG/1
TABLET ORAL
COMMUNITY
Start: 2024-07-13

## 2024-07-16 NOTE — PROGRESS NOTES
Assessment & Plan:      Problem List Items Addressed This Visit    None  Visit Diagnoses       Throat pain    -  Primary    Relevant Orders    Streptococcus A Rapid Screen w/Reflex to PCR - Clinic Collect    Acute pain of right shoulder              Medical Decision Making  Patient presents for her fourth visit of ongoing right shoulder pains over the last 1 to 2 months with new complaints of sore throat and difficulty swallowing.  No signs of respiratory distress seen on today's exam.  Rapid strep is negative.  Sore throat appears unrelated to right shoulder pains and could either be patient's anxiety versus early viral upper respiratory infection.  Recommend continue to follow-up with previous referral for physical therapy for ongoing pains of the right shoulder.  Otherwise low concern for acute fracture at this time.  Discussed treatment and symptomatic care.  Allergies and medication interactions reviewed.  Discussed signs of worsening symptoms and when to follow-up with PCP if no symptom improvement.     Subjective:      Marine Andrews is a 34 year old female here for evaluation of ongoing right shoulder pains associated with new sore throat and difficulty swallowing.  Onset of symptoms was 1 to 2 months ago.  Patient was initially diagnosed with a cervical strain.  She is a  and frequently has to lift things above her head.  This is now patient's fourth visit for the symptoms.  She was already referred to physical therapy, and has yet to follow-up with them.  New symptoms in the last day include difficulty swallowing and throat discomfort.  Patient called nurse triage and was told to be seen in the walk-in care clinic today.     The following portions of the patient's history were reviewed and updated as appropriate: allergies, current medications, and problem list.     Review of Systems  Pertinent items are noted in HPI.    Allergies  Allergies   Allergen Reactions    Ceclor [Cefaclor] Rash        Family History   Problem Relation Age of Onset    Family History Negative Mother     Family History Negative Father     Family History Negative Maternal Grandmother     Family History Negative Maternal Grandfather     Cancer Paternal Grandmother         Esophageal cancer    Family History Negative Paternal Grandfather     Ulcers Mother     Ulcers Maternal Grandmother        Social History     Tobacco Use    Smoking status: Never    Smokeless tobacco: Never    Tobacco comments:     dad smokes   Substance Use Topics    Alcohol use: No        Objective:      /83   Pulse 68   Temp 98.3  F (36.8  C) (Oral)   Resp 18   SpO2 100%   General appearance - alert, well appearing, and in no distress and non-toxic  Mouth - posterior pharynx is mildly erythematous with no tonsillar swelling or exudate, mucous membranes moist  Neck - mild bilateral anterior cervical lymphadenopathy  Extremities - tenderness throughout the right shoulder muscles, no significant tenderness to palpation of the collarbone  Skin - right shoulder: No significant swelling, ecchymosis, erythema, skin is normal warmth to touch     Lab & Imaging Results    No results found for any visits on 07/16/24.    I personally reviewed these results and discussed findings with the patient.    The use of Dragon/Stepsss dictation services was used to construct the content of this note; any grammatical errors are non-intentional. Please contact the author directly if you are in need of any clarification.

## 2024-07-24 ENCOUNTER — HOSPITAL ENCOUNTER (EMERGENCY)
Facility: CLINIC | Age: 34
Discharge: HOME | End: 2024-07-24
Attending: EMERGENCY MEDICINE | Admitting: EMERGENCY MEDICINE
Payer: COMMERCIAL

## 2024-07-24 VITALS
HEIGHT: 66 IN | BODY MASS INDEX: 26.52 KG/M2 | OXYGEN SATURATION: 100 % | HEART RATE: 69 BPM | WEIGHT: 165 LBS | DIASTOLIC BLOOD PRESSURE: 87 MMHG | SYSTOLIC BLOOD PRESSURE: 152 MMHG | RESPIRATION RATE: 18 BRPM | TEMPERATURE: 97.1 F

## 2024-07-24 DIAGNOSIS — R13.19 ESOPHAGEAL DYSPHAGIA: ICD-10-CM

## 2024-07-24 PROCEDURE — 99282 EMERGENCY DEPT VISIT SF MDM: CPT

## 2024-07-24 ASSESSMENT — COLUMBIA-SUICIDE SEVERITY RATING SCALE - C-SSRS
2. HAVE YOU ACTUALLY HAD ANY THOUGHTS OF KILLING YOURSELF IN THE PAST MONTH?: NO
1. IN THE PAST MONTH, HAVE YOU WISHED YOU WERE DEAD OR WISHED YOU COULD GO TO SLEEP AND NOT WAKE UP?: NO
6. HAVE YOU EVER DONE ANYTHING, STARTED TO DO ANYTHING, OR PREPARED TO DO ANYTHING TO END YOUR LIFE?: YES

## 2024-07-24 NOTE — ED TRIAGE NOTES
Pt presents to ED with sensation that something is stuck in her throat.  Pt had physical therapy today for her neck and shoulder, was told she would be very sore, but not sure why she is having this feeling in her throat.  Pt also notes some dizziness/lightheadedness, does report hx of anxiety/panic attacks.      Triage Assessment (Adult)       Row Name 07/24/24 0101          Triage Assessment    Airway WDL X  feeling of something stuck in her throat, is handling oral secretions and oral fluids.        Respiratory WDL    Respiratory WDL WDL        Skin Circulation/Temperature WDL    Skin Circulation/Temperature WDL WDL        Cardiac WDL    Cardiac WDL WDL        Peripheral/Neurovascular WDL    Peripheral Neurovascular WDL WDL        Cognitive/Neuro/Behavioral WDL    Cognitive/Neuro/Behavioral WDL WDL

## 2024-07-24 NOTE — ED PROVIDER NOTES
EMERGENCY DEPARTMENT ENCOUnter      NAME: Marine Andrews  AGE: 34 year old female  YOB: 1990  MRN: [id number]  EVALUATION DATE & TIME: No admission date for patient encounter.    PCP: No Ref-Primary, Physician    ED PROVIDER: Daiana Davis MD      Chief Complaint   Patient presents with    FB sensation in throat    Panic Attack         FINAL IMPRESSION:  1. Esophageal dysphagia          ED COURSE & MEDICAL DECISION MAKING:      In summary, the patient is a 34-year-old female that presents to the emergency department for evaluation of difficulty swallowing.  Patient is able to swallow liquids without difficulty.  History of present illness is not consistent with esophageal food impaction.   I recommended close outpatient follow-up with gastroenterology for further evaluation of her swallowing difficulty.      1:19 AM I met with the patient to gather history and perform my exam. Patient seen in lobby due to critical capacity and boarding crisis leaving no ED rooms available.     At the conclusion of the encounter I discussed the results of all of the tests and the disposition. The questions were answered. The patient or family acknowledged understanding and was agreeable with the care plan.     Medical Decision Making  Obtained supplemental history:Supplemental history obtained?: Documented in chart and Family Member/Significant Other  Reviewed external records: External records reviewed?: Documented in chart and Outpatient Record: Office visit on 7/18/24  Care impacted by chronic illness:N/A  Care significantly affected by social determinants of health:N/A  Did you consider but not order tests?: Work up considered but not performed and documented in chart, if applicable  Did you interpret images independently?: Independent interpretation of ECG and images noted in documentation, when applicable.  Consultation discussion with other provider:Did you involve another provider (consultant, ,  "pharmacy, etc.)?: No  Discharge. No recommendations on prescription strength medication(s). See documentation for any additional details.    MEDICATIONS GIVEN IN THE EMERGENCY:  Medications - No data to display    NEW PRESCRIPTIONS STARTED AT TODAY'S ER VISIT  Discharge Medication List as of 7/24/2024  1:48 AM             =================================================================    HPI        Marine Andrews is a 34 year old female with a pertinent history of strain of neck muscle and muscle strain of chest wall who presents to this ED via walk-in for evaluation of foreign body sensation in throat     Per chart review: The patient was seen in the clinic on 7/18/2024 for throat problem.  The patient reported trouble swallowing and inflammation on the right side of her neck and collarbone as well as frequent swallowing difficulty since May 2024.  Patient was prescribed Zanaflex and prednisone for symptoms.    Per chart review: Patient was seen see at [facility] physical therapy on 7/23/2024 for strain of neck muscle and muscle strain of chest wall    The patient reports that for the past 2 hours it \"feels like something is stuck in my throat\".  The patient tried to drink water without relief.  The patient was able to keep water down, but states it was \"really hard to swallow it\".    The patient's friend states that the patient has a history of a \"pinched nerve\" in her \"rib cage\" as well as a neck injury.  He thinks that this could be causing the swallowing problem.  The patient also reports she was at physical therapy yesterday for her neck injury and notes that her neck injury symptoms have become more \"aggravated\" as well as more swelling in her \"collarbone area\".    The patient denies a history of medical problems, taking daily medications, or mediation allergies.    Social history: The patient denies smoking or alcohol use    REVIEW OF SYSTEMS     Constitutional:  Denies fever or chills  HENT:  Denies " "sore throat. Positive for trouble swallowing  Respiratory:  Denies cough or shortness of breath   Cardiovascular:  Denies chest pain or palpitations  GI:  Denies abdominal pain, nausea, or vomiting  Musculoskeletal:  Denies any new extremity pain. Positive for neck pain and collarbone swelling   Skin:  Denies rash   Neurologic:  Denies headache, focal weakness or sensory changes    All other systems reviewed and are negative      PAST MEDICAL HISTORY:  No past medical history on file.    PAST SURGICAL HISTORY:  Past Surgical History:   Procedure Laterality Date    TUBAL LIGATION Right            CURRENT MEDICATIONS:    cyclobenzaprine (FLEXERIL) 5 MG tablet  hydrOXYzine HCl (ATARAX) 25 MG tablet  methylPREDNISolone (MEDROL DOSEPAK) 4 MG tablet therapy pack  tiZANidine (ZANAFLEX) 2 MG tablet        ALLERGIES:  Allergies   Allergen Reactions    Ceclor [Cefaclor] Rash       FAMILY HISTORY:  Family History   Problem Relation Age of Onset    Family History Negative Mother     Family History Negative Father     Family History Negative Maternal Grandmother     Family History Negative Maternal Grandfather     Cancer Paternal Grandmother         Esophageal cancer    Family History Negative Paternal Grandfather     Ulcers Mother     Ulcers Maternal Grandmother        SOCIAL HISTORY:   Social History     Socioeconomic History    Marital status: Single   Tobacco Use    Smoking status: Never    Smokeless tobacco: Never    Tobacco comments:     dad smokes   Substance and Sexual Activity    Alcohol use: No    Drug use: No    Sexual activity: Yes     Partners: Male     Comment: states \"using protection\"     Social Determinants of Health     Financial Resource Strain: Low Risk  (7/7/2024)    Received from [facility]    Financial Resource Strain     Difficulty of Paying Living Expenses: 3   Food Insecurity: No Food Insecurity (7/7/2024)    Received from [facility]    " Food Insecurity     Worried About Running Out of Food in the Last Year: 1   Transportation Needs: No Transportation Needs (7/7/2024)    [place] [facility]    Transportation Needs     Lack of Transportation (Medical): 1   Social Connections: Socially Integrated (7/7/2024)    Received from [facility]    Social Connections     Frequency of Communication with Friends and Family: 0   Housing Stability: Low Risk  (7/7/2024)    Received from [facility]    Housing Stability     Unable to Pay for Housing in the Last Year: 1             PHYSICAL EXAM    Constitutional:  Well developed, Well nourished,  HENT:  Normocephalic, Atraumatic, Bilateral external ears normal, Oropharynx moist, Nose normal.   Neck:  Normal range of motion, No meningismus, No stridor.   Eyes:  EOMI, Conjunctiva normal, No discharge.   Respiratory:  Normal breath sounds, No respiratory distress, No wheezing, No chest tenderness.   Cardiovascular:  Normal heart rate, Normal rhythm, No murmurs  GI:  Soft, No tenderness, No guarding,   Musculoskeletal:  Neurovascularly intact distally, No edema, No tenderness, No cyanosis, Good range of motion in all major joints.   Integument:  Warm, Dry, No erythema, No rash.   Lymphatic:  No lymphadenopathy noted.   Neurologic:  Alert & oriented, Normal motor function,  No focal deficits noted.   Psychiatric:  Affect normal, Judgment normal, Mood normal.         I, Laurie Brownlee, am serving as a scribe to document services personally performed by Dr. Davis based on my observation and the provider's statements to me. I, Daiana Davis MD attest that Laurie Brownlee is acting in a scribe capacity, has observed my performance of the services and has documented them in accordance with my direction.    Daiana Davis MD  Emergency Medicine  [facility]  EMERGENCY ROOM  [address] [place] [id number]  [phone]  Dept: [phone]     Daiana Davis MD  07/25/24 0338

## 2024-07-24 NOTE — ED NOTES
Pt discharging from the lobby. See provider's notes for full assessment and evaluation. Education provided to pt on worsening symptoms to watch out for and follow-up with GI. AVS thoroughly reviewed with pt. All questions were answered. Vitally stable upon discharge.

## 2024-07-26 ENCOUNTER — TELEPHONE (OUTPATIENT)
Dept: GASTROENTEROLOGY | Facility: CLINIC | Age: 34
End: 2024-07-26

## 2024-07-26 NOTE — TELEPHONE ENCOUNTER
[facility] Call Center    Phone Message    May a detailed message be left on voicemail: Yes    Reason for Call: Other: Patient is currently scheduled on 10/08/2024, as visit type New GI Urgent. This is outside the expected timeline for this referral. Patient has been added to the waitlist.      Action Taken: Message routed to:  Other: GI REFERRAL TRIAGE POOL     Travel Screening: Not Applicable

## 2024-08-25 ENCOUNTER — HOSPITAL ENCOUNTER (EMERGENCY)
Facility: CLINIC | Age: 34
Discharge: HOME | End: 2024-08-25
Attending: STUDENT IN AN ORGANIZED HEALTH CARE EDUCATION/TRAINING PROGRAM | Admitting: STUDENT IN AN ORGANIZED HEALTH CARE EDUCATION/TRAINING PROGRAM
Payer: COMMERCIAL

## 2024-08-25 VITALS
HEART RATE: 74 BPM | TEMPERATURE: 98.1 F | SYSTOLIC BLOOD PRESSURE: 131 MMHG | BODY MASS INDEX: 26.52 KG/M2 | RESPIRATION RATE: 18 BRPM | OXYGEN SATURATION: 99 % | DIASTOLIC BLOOD PRESSURE: 74 MMHG | WEIGHT: 165 LBS | HEIGHT: 66 IN

## 2024-08-25 DIAGNOSIS — R09.A2 GLOBUS SENSATION: ICD-10-CM

## 2024-08-25 PROCEDURE — 250N000013 HC RX MED GY IP 250 OP 250 PS 637: Performed by: STUDENT IN AN ORGANIZED HEALTH CARE EDUCATION/TRAINING PROGRAM

## 2024-08-25 PROCEDURE — 99283 EMERGENCY DEPT VISIT LOW MDM: CPT

## 2024-08-25 PROCEDURE — 250N000009 HC RX 250: Performed by: STUDENT IN AN ORGANIZED HEALTH CARE EDUCATION/TRAINING PROGRAM

## 2024-08-25 RX ORDER — LIDOCAINE HYDROCHLORIDE 20 MG/ML
10 SOLUTION OROPHARYNGEAL ONCE
Status: COMPLETED | OUTPATIENT
Start: 2024-08-25 | End: 2024-08-25

## 2024-08-25 RX ORDER — PANTOPRAZOLE SODIUM 40 MG/1
40 TABLET, DELAYED RELEASE ORAL ONCE
Status: COMPLETED | OUTPATIENT
Start: 2024-08-25 | End: 2024-08-25

## 2024-08-25 RX ADMIN — PANTOPRAZOLE SODIUM 40 MG: 40 TABLET, DELAYED RELEASE ORAL at 21:18

## 2024-08-25 RX ADMIN — LIDOCAINE HYDROCHLORIDE 10 ML: 20 SOLUTION ORAL at 21:18

## 2024-08-25 ASSESSMENT — ACTIVITIES OF DAILY LIVING (ADL): ADLS_ACUITY_SCORE: 35

## 2024-08-25 ASSESSMENT — COLUMBIA-SUICIDE SEVERITY RATING SCALE - C-SSRS
1. IN THE PAST MONTH, HAVE YOU WISHED YOU WERE DEAD OR WISHED YOU COULD GO TO SLEEP AND NOT WAKE UP?: NO
2. HAVE YOU ACTUALLY HAD ANY THOUGHTS OF KILLING YOURSELF IN THE PAST MONTH?: NO
6. HAVE YOU EVER DONE ANYTHING, STARTED TO DO ANYTHING, OR PREPARED TO DO ANYTHING TO END YOUR LIFE?: YES

## 2024-08-26 NOTE — ED PROVIDER NOTES
EMERGENCY DEPARTMENT ENCOUNTER      NAME: Marine Andrews  AGE: 34 year old female  YOB: 1990  MRN: [id number]  EVALUATION DATE & TIME: 8/25/2024  8:34 PM    PCP: No Ref-Primary, Physician    ED PROVIDER: Samuel Leblanc MD      Chief Complaint   Patient presents with    Swallowed Foreign Body         FINAL IMPRESSION:  1. Globus sensation          ED COURSE & MEDICAL DECISION MAKING:    Pertinent Labs & Imaging studies reviewed. (See chart for details)  34 year old female presents to the Emergency Department for evaluation of globus sensation    ED Course as of 08/25/24 2251   Sun Aug 25, 2024   2056 I met with the patient, obtained history, performed an initial exam, and discussed options and plan for diagnostics and treatment here in the ED.    2112 Patient is a 34-year-old female presents the emergency department for foreign body sensation in her throat.  Says she has been having this sensation intermittently for the past several months but it was exacerbated today when she was eating a walking taco and feels like maybe a corn chip scratched her throat.  Still tolerating secretions and able to drink water.  No fevers.  No nausea or vomiting or hematemesis.  Denies any abdominal pain.  States she is also had some chronic right clavicle or shoulder pain has been ongoing for months as well for which she takes tizanidine.  Has not had any endoscopies in the past but is scheduled for 1 in October or November of this year.    On exam patient is well-appearing in no acute distress.  Hemodynamically stable and afebrile.  She is tolerating her secretions without any signs of respiratory distress.  Normal voice no stridor.  No pharyngeal erythema.  No subcutaneous emphysema.  Lungs are clear without any wheezes or rales.  Abdomen soft and benign.    Suspect globus sensation as cause of her symptoms.  With her able to tolerate secretions and water here I do not suspect food bolus impaction requiring emergent  intervention.  Will trial viscous lidocaine for symptomatic management and patient does have a history of GERD and recommend restarting omeprazole.  This was prescribed for her.  Otherwise I did place a GI  referral to see if she can get any sooner as I think she likely does need nonemergent endoscopy given her persistent globus sensation over the past several months.  Otherwise patient safe for discharge at this point in time.  Patient comfortable with this plan.         Medical Decision Making  Obtained supplemental history:Supplemental history obtained?: No  Reviewed external records: External records reviewed?: Documented in chart  Care impacted by chronic illness:N/A  Care significantly affected by social determinants of health:N/A  Did you consider but not order tests?: Work up considered but not performed and documented in chart, if applicable  Did you interpret images independently?: Independent interpretation of ECG and images noted in documentation, when applicable.  Consultation discussion with other provider:Did you involve another provider (consultant, MH, pharmacy, etc.)?: No  Discharge. I prescribed additional prescription strength medication(s) as charted. See documentation for any additional details.          At the conclusion of the encounter I discussed the results of all of the tests and the disposition. The questions were answered. The patient or family acknowledged understanding and was agreeable with the care plan.     0 minutes of critical care time     MEDICATIONS GIVEN IN THE EMERGENCY:  Medications   lidocaine (viscous) (XYLOCAINE) 2 % solution 10 mL (10 mLs Swish & Swallow $Given 8/25/24 2118)   pantoprazole (PROTONIX) EC tablet 40 mg (40 mg Oral $Given 8/25/24 2118)       NEW PRESCRIPTIONS STARTED AT TODAY'S ER VISIT  Discharge Medication List as of 8/25/2024  9:22 PM        START taking these medications    Details   omeprazole (PRILOSEC) 20 MG DR capsule Take 1 capsule (20 mg)  by mouth daily., Disp-30 capsule, R-0, E-Prescribe                =================================================================    HPI    Patient information was obtained from: patient.     Use of : N/A      Marine Andrews is a 34 year old female with no relevant pertinent history who presents to this ED by private vehicle for evaluation of swallowed foreign body.     Per chart review: Patient was seen on 8/15/24 at [facility] for neck pain/problem. States right shoulder strain and strain of neck muscle. Patient is taking Zanaflex for muscle pain. Recommended to continue physical therapy and follow-up in 2 weeks.     Patient states she feels like something is stuck in her throat, not sure if it is from her anxiety or inflammation to her right collar bone. Describes it as a stabbing pain in her throat that worsens when eating chips. States her throat pain is intermittent. She is able to swallow fluids okay. Notes her throat has felt this way since her collar bone and neck pain. Mentions she was seen for her right collar bone pain before and neck pain. States weird taste in her mouth.     States rib pain that causes her abdomen to feel a burning sensation.     She has an appointment made with her GI doctor in October. States she has history of heart burn and acid reflux. She has taken medication for her acid reflux before, not on any right now.     She has not had an endoscopy done before. Denies having abdominal surgery and any other complaints or concerns at this time.        REVIEW OF SYSTEMS   Refer to the [facility]    PAST MEDICAL HISTORY:  Past Medical History:   Diagnosis Date    Anxiety     Depressive disorder        PAST SURGICAL HISTORY:  Past Surgical History:   Procedure Laterality Date    TUBAL LIGATION Right            CURRENT MEDICATIONS:    omeprazole (PRILOSEC) 20 MG DR capsule  cyclobenzaprine (FLEXERIL) 5 MG tablet  hydrOXYzine HCl (ATARAX) 25 MG tablet  methylPREDNISolone (MEDROL  "DOSEPAK) 4 MG tablet therapy pack  tiZANidine (ZANAFLEX) 2 MG tablet        ALLERGIES:  Allergies   Allergen Reactions    Ceclor [Cefaclor] Rash       FAMILY HISTORY:  Family History   Problem Relation Age of Onset    Family History Negative Mother     Family History Negative Father     Family History Negative Maternal Grandmother     Family History Negative Maternal Grandfather     Cancer Paternal Grandmother         Esophageal cancer    Family History Negative Paternal Grandfather     Ulcers Mother     Ulcers Maternal Grandmother        SOCIAL HISTORY:   Social History     Socioeconomic History    Marital status: Single   Tobacco Use    Smoking status: Never    Smokeless tobacco: Never    Tobacco comments:     dad smokes   Substance and Sexual Activity    Alcohol use: No    Drug use: No    Sexual activity: Yes     Partners: Male     Comment: states \"using protection\"     Social Determinants of Health     Financial Resource Strain: Low Risk  (7/7/2024)    [facility]    Financial Resource Strain     Difficulty of Paying Living Expenses: 3   Food Insecurity: No Food Insecurity (7/7/2024)    [facility]    Food Insecurity     Worried About Running Out of Food in the Last Year: 1   Transportation Needs: No Transportation Needs (7/7/2024)    Received from [facility]    Transportation Needs     Lack of Transportation (Medical): 1   Social Connections: Socially Integrated (7/7/2024)    [facility]    Social Connections     Frequency of Communication with Friends and Family: 0   Housing Stability: Low Risk  (7/7/2024)    Received from [facility]    Housing Stability     Unable to Pay for Housing in the Last Year: 1       VITALS:  /74   Pulse 74   Temp 98.1  F (36.7  C) (Oral)   Resp 18   Ht 1.676 m (5' 6\")  "  Wt 74.8 kg (165 lb)   SpO2 99%   BMI 26.63 kg/m      PHYSICAL EXAM    Constitutional: Well developed, Well nourished, NAD,  HENT: Normocephalic, Atraumatic,  mucous membranes moist,   Neck- trachea midline, No stridor. Tolerates secretions.  No pharyngeal erythema.   Eyes:EOMI, Conjunctiva normal, No discharge.   Respiratory: Normal breath sounds, No respiratory distress, No wheezing.    Cardiovascular: Normal heart rate, Regular rhythm,  No murmurs,   Abdominal: Soft, No tenderness, No rebound or guarding. No abdominal pain.     Musculoskeletal: no deformity or malalignment   Integument: Warm, Dry, No erythema,    Neurologic: Alert & oriented x 3   Psychiatric: Affect normal, Cooperative.      LAB:  All pertinent labs reviewed and interpreted.       RADIOLOGY:  Reviewed all pertinent imaging. Please see official radiology report.  No orders to display       EKG:        PROCEDURES:         [employer] [facility] Documentation:   CMS Diagnoses:              I, LauraNato Quintanilla, am serving as a scribe to document services personally performed by Samuel Leblanc MD based on my observation and the provider's statements to me. I, Samuel Leblanc MD, attest that Milka Quintanilla is acting in a scribe capacity, has observed my performance of the services and has documented them in accordance with my direction.    Samuel Leblanc MD  [facility] EMERGENCY ROOM  [address] [facility] [phone]       Samuel Leblanc MD  08/25/24 9468

## 2024-08-26 NOTE — DISCHARGE INSTRUCTIONS
I suspect the cause of your symptoms was called a globus sensation which can be a feeling of something stuck in your throat.   this can sometimes be caused by acid reflux and you been prescribed omeprazole to take once a day until you are able to follow-up with GI doctor.  If you develop recurrent vomiting, inability to tolerate your saliva or drink or other concerning symptoms please return to the emergency department for repeat evaluation.

## 2024-08-26 NOTE — ED TRIAGE NOTES
Around 8pm was eating a walking taco and feels like food in stuck in the back of the throat.  Able to handle secretions.  Is taking a tizanidine for a right shoulder injury from May. Has the sensation that food is getting suck a few times a week since then.        Triage Assessment (Adult)       Row Name 08/25/24 2040          Triage Assessment    Airway WDL WDL        Respiratory WDL    Respiratory WDL WDL        Skin Circulation/Temperature WDL    Skin Circulation/Temperature WDL WDL        Cardiac WDL    Cardiac WDL WDL        Peripheral/Neurovascular WDL    Peripheral Neurovascular WDL WDL        Cognitive/Neuro/Behavioral WDL    Cognitive/Neuro/Behavioral WDL WDL

## 2024-08-28 ENCOUNTER — OFFICE VISIT (OUTPATIENT)
Dept: FAMILY MEDICINE | Facility: CLINIC | Age: 34
End: 2024-08-28
Payer: COMMERCIAL

## 2024-08-28 VITALS
DIASTOLIC BLOOD PRESSURE: 84 MMHG | TEMPERATURE: 97.9 F | RESPIRATION RATE: 14 BRPM | OXYGEN SATURATION: 100 % | SYSTOLIC BLOOD PRESSURE: 143 MMHG | HEART RATE: 71 BPM

## 2024-08-28 DIAGNOSIS — R13.10 DYSPHAGIA, UNSPECIFIED TYPE: ICD-10-CM

## 2024-08-28 DIAGNOSIS — R21 RASH: Primary | ICD-10-CM

## 2024-08-28 PROCEDURE — 87798 DETECT AGENT NOS DNA AMP: CPT | Performed by: FAMILY MEDICINE

## 2024-08-28 PROCEDURE — 99214 OFFICE O/P EST MOD 30 MIN: CPT | Performed by: FAMILY MEDICINE

## 2024-08-28 RX ORDER — TRIAMCINOLONE ACETONIDE 1 MG/G
OINTMENT TOPICAL 2 TIMES DAILY
Qty: 15 G | Refills: 0 | Status: SHIPPED | OUTPATIENT
Start: 2024-08-28

## 2024-08-28 RX ORDER — ACYCLOVIR 200 MG/5ML
800 SUSPENSION ORAL
Qty: 700 ML | Refills: 0 | Status: SHIPPED | OUTPATIENT
Start: 2024-08-28 | End: 2024-09-04

## 2024-08-28 NOTE — TELEPHONE ENCOUNTER
Pt called back.  offered an appointment with Silva Quintanilla on 8/29/2024 at 11 AM, however due to the short notice, Pt declined the appointment. Pt also declined a virtual visit; requesting for an in-person clinic visit.

## 2024-08-29 ENCOUNTER — TELEPHONE (OUTPATIENT)
Dept: FAMILY MEDICINE | Facility: CLINIC | Age: 34
End: 2024-08-29
Payer: COMMERCIAL

## 2024-08-29 LAB — VZV DNA SPEC QL NAA+PROBE: NOT DETECTED

## 2024-08-29 NOTE — PROGRESS NOTES
Assessment/Plan:   1. Rash  2. Dysphagia   - Varicella Zoster DNA PCR CSF or Skin Swab  - triamcinolone (KENALOG) 0.1 % external ointment; Apply topically 2 times daily.  Dispense: 15 g; Refill: 0  - acyclovir (ZOVIRAX) 200 MG/5ML suspension; Take 20 mLs (800 mg) by mouth 5 times daily for 7 days.  Dispense: 700 mL; Refill: 0    Acute onset of a localized rash overlying the spine, slightly to the right side near the T12/L1 area. No pain with percussion of the spine. No vesicles at this time. Burning pain and itch at this site.   No apparent injury, scrape, sting, contact irritant.   She has a chronic burning pain and spasm involving the right side neck, shoulder upper back and anterior right upper chest due to a prior injury and has dysphagia related to that. That burning pain and trouble swallowing do not seem to be related to this new burning pain rash on the lower spine.  Consider shingles. Swab was obtained for VZV PCR which is pending.   Will start acyclovir suspension pending the results - she doesn't think she could handle the valtrex pills due to her swallowing problem.   Topical triamcinolone ointment twice daily prescribed     Plan:  Possible shingles rash.   Test is pending. We will call with result.     Acyclovir liquid 20ml 5 times a day for 7 days - about every 5 hours.   Take with food if possible.     Decrease dose of tizanidine due to possible interaction with the acyclovir.   1/2 tab at a time possible or farther apart or not at work.     Ice, heat as needed  Triamcinolone ointment sparingly on rash for itch   May use hydrocortisone cream 1% over the counter if you have it at home already.   Calamine lotion helps.     Recheck if worse or new concerns.       I discussed red flag symptoms, return precautions to clinic/ER and follow up care with patient/parent.  Expected clinical course, symptomatic cares advised. Questions answered. Patient/parent amenable with plan.      Subjective:     Marine FISCHER  Darryl is a 34 year old female who presents for evaluation of a rash on her back.   Abrupt onset tonight while at work.   No apparent injury, scrape, sting, or contact.   She felt onset of burning, pain and itchiness in the middle of her back. She felt a rough area and when she looked she noted a red area overlying the spine at the mid/lower back.   She has had chronic neck/upper back/shoulder/anterior right upper chest pain from a prior injury which is also burning in nature but not really different than usual today. This pain/dysfunction does affect her swallowing - which is also not different today.   No other rashes.     Allergies   Allergen Reactions    Ceclor [Cefaclor] Rash     Current Outpatient Medications   Medication Sig Dispense Refill    acyclovir (ZOVIRAX) 200 MG/5ML suspension Take 20 mLs (800 mg) by mouth 5 times daily for 7 days. 700 mL 0    tiZANidine (ZANAFLEX) 2 MG tablet TAKE 1 TABLET(2 MG) BY MOUTH EVERY 6 HOURS AS NEEDED FOR MUSCLE SPASM      triamcinolone (KENALOG) 0.1 % external ointment Apply topically 2 times daily. 15 g 0    cyclobenzaprine (FLEXERIL) 5 MG tablet Take 1 tablet (5 mg) by mouth 3 times daily as needed for muscle spasms (Patient not taking: Reported on 7/16/2024) 30 tablet 0    hydrOXYzine HCl (ATARAX) 25 MG tablet Take 2 tablets (50 mg) by mouth every 8 hours as needed for anxiety (Patient not taking: Reported on 8/28/2024) 20 tablet 0    methylPREDNISolone (MEDROL DOSEPAK) 4 MG tablet therapy pack Follow Package Directions (Patient not taking: Reported on 7/16/2024) 21 tablet 0    omeprazole (PRILOSEC) 20 MG DR capsule Take 1 capsule (20 mg) by mouth daily. (Patient not taking: Reported on 8/28/2024) 30 capsule 0     No current facility-administered medications for this visit.     Patient Active Problem List   Diagnosis    Proteinuria    Allergic rhinitis    Benign neoplasm of skin    Encounter for triage in pregnant patient    Acute cystitis without hematuria        Objective:     BP (!) 143/84   Pulse 71   Temp 97.9  F (36.6  C) (Tympanic)   Resp 14   SpO2 100%     Physical    General Appearance: Alert, pleasant, no distress but uncomfortable. aVSS  Head: Normocephalic, without obvious abnormality, atraumatic  Eyes: Conjunctivae are normal.   Throat: Throat is normal.  No exudate.  No vesicular lesions  Neck: No adenopathy, baseline pain with movement and palpation  Lungs: respirations unlabored  Back: no pain with percussion of the spine.   Skin: There is an irregular red patch of tiny maculopapules overlying the spine near the T12-L1 area. No vesicles. No induration or swelling. No other rashes or lesions elsewhere.   After an alcohol swab, one papule was opened with bevelled needle and a swab obtained for VZV PCR.   Psychiatric: Patient has a normal mood and affect.         No results found for any visits on 08/28/24.    This note has been dictated in part using voice recognition software.  Any grammatical or context distortions are unintentional and inherent to the software.  Please feel free to contact me directly for clarification if needed.

## 2024-08-29 NOTE — PATIENT INSTRUCTIONS
Possible shingles rash.   Test is pending. We will call with result.     Acyclovir liquid 20ml 5 times a day for 7 days - about every 5 hours.   Take with food if possible.     Decrease dose of tizanidine due to possible interaction with the acyclovir.   1/2 tab at a time possible or farther apart or not at work.     Ice, heat as needed  Triamcinolone ointment sparingly on rash for itch   May use hydrocortisone cream 1% over the counter if you have it at home already.   Calamine lotion helps.     Recheck if worse or new concerns.

## 2024-08-29 NOTE — TELEPHONE ENCOUNTER
RN left non detailed vm for pt regarding results.    Please advise below if returns call:      Varicella PCR negative, ruling out zoster.  Patient to stop taking valacyclovir and continue with prescribed Kenalog cream twice daily.  Return if symptoms do not improve     DO Elyse Castañeda RN

## 2024-08-29 NOTE — LETTER
September 1, 2024      Marine Andrews  [address]   [place] [address]        Dear ,    We are writing to inform you of your test results.    Your varicella PCR test came back negative, ruling out Zoster. You should stop taking the valacyclovir and continue with the prescribed kenalog cream twice daily. Return if symptoms do not improve.    Varicella Zoster DNA PCR CSF or Skin Swab  Order: [id number]  Status: Final result       Visible to patient: Yes (seen)       Dx: Rash    6 Result Notes      Component  Ref Range & Units 4 d ago   Varicella Zoster DNA by PCR  Not Detected Not Detected   Comment: The [employer] Molecular Simplexa VZV Direct assay on the LIAISON MDX instrument is an FDA approved, real-time PCR test for the qualitative detection of VZV DNA from patients with signs and symptoms of infection.   Resulting Agency [facility]          If you have any questions or concerns, please call the clinic at the number listed above.       Sincerely,      [facility] Walk In Care Team

## 2024-08-30 ENCOUNTER — TELEPHONE (OUTPATIENT)
Dept: FAMILY MEDICINE | Facility: CLINIC | Age: 34
End: 2024-08-30
Payer: COMMERCIAL

## 2024-09-05 ENCOUNTER — HOSPITAL ENCOUNTER (EMERGENCY)
Facility: CLINIC | Age: 34
Discharge: HOME | End: 2024-09-05
Admitting: STUDENT IN AN ORGANIZED HEALTH CARE EDUCATION/TRAINING PROGRAM

## 2024-09-05 ENCOUNTER — APPOINTMENT (OUTPATIENT)
Dept: CT IMAGING | Facility: CLINIC | Age: 34
End: 2024-09-05
Attending: STUDENT IN AN ORGANIZED HEALTH CARE EDUCATION/TRAINING PROGRAM

## 2024-09-05 DIAGNOSIS — R13.19 ESOPHAGEAL DYSPHAGIA: ICD-10-CM

## 2024-09-05 DIAGNOSIS — M54.2 NECK PAIN ON LEFT SIDE: ICD-10-CM

## 2024-09-05 LAB
ALBUMIN SERPL BCG-MCNC: 4.7 G/DL (ref 3.5–5.2)
ALP SERPL-CCNC: 88 U/L (ref 40–150)
ALT SERPL W P-5'-P-CCNC: 20 U/L (ref 0–50)
ANION GAP SERPL CALCULATED.3IONS-SCNC: 14 MMOL/L (ref 7–15)
AST SERPL W P-5'-P-CCNC: 19 U/L (ref 0–45)
BASOPHILS # BLD AUTO: 0.1 10E3/UL (ref 0–0.2)
BASOPHILS NFR BLD AUTO: 1 %
BILIRUB DIRECT SERPL-MCNC: <0.2 MG/DL (ref 0–0.3)
BILIRUB SERPL-MCNC: 0.6 MG/DL
BUN SERPL-MCNC: <1.4 MG/DL (ref 6–20)
CALCIUM SERPL-MCNC: 9.1 MG/DL (ref 8.8–10.4)
CHLORIDE SERPL-SCNC: 103 MMOL/L (ref 98–107)
CREAT SERPL-MCNC: 0.59 MG/DL (ref 0.51–0.95)
EGFRCR SERPLBLD CKD-EPI 2021: >90 ML/MIN/1.73M2
EOSINOPHIL # BLD AUTO: 0.1 10E3/UL (ref 0–0.7)
EOSINOPHIL NFR BLD AUTO: 2 %
ERYTHROCYTE [DISTWIDTH] IN BLOOD BY AUTOMATED COUNT: 12.7 % (ref 10–15)
FLUAV RNA SPEC QL NAA+PROBE: NEGATIVE
FLUBV RNA RESP QL NAA+PROBE: NEGATIVE
GLUCOSE SERPL-MCNC: 91 MG/DL (ref 70–99)
GROUP A STREP BY PCR: NOT DETECTED
HCG SERPL QL: NEGATIVE
HCO3 SERPL-SCNC: 24 MMOL/L (ref 22–29)
HCT VFR BLD AUTO: 40.7 % (ref 35–47)
HGB BLD-MCNC: 14.1 G/DL (ref 11.7–15.7)
IMM GRANULOCYTES # BLD: 0 10E3/UL
IMM GRANULOCYTES NFR BLD: 0 %
LIPASE SERPL-CCNC: 17 U/L (ref 13–60)
LYMPHOCYTES # BLD AUTO: 2 10E3/UL (ref 0.8–5.3)
LYMPHOCYTES NFR BLD AUTO: 34 %
MCH RBC QN AUTO: 29.2 PG (ref 26.5–33)
MCHC RBC AUTO-ENTMCNC: 34.6 G/DL (ref 31.5–36.5)
MCV RBC AUTO: 84 FL (ref 78–100)
MONOCYTES # BLD AUTO: 0.6 10E3/UL (ref 0–1.3)
MONOCYTES NFR BLD AUTO: 10 %
NEUTROPHILS # BLD AUTO: 3.1 10E3/UL (ref 1.6–8.3)
NEUTROPHILS NFR BLD AUTO: 53 %
NRBC # BLD AUTO: 0 10E3/UL
NRBC BLD AUTO-RTO: 0 /100
PLATELET # BLD AUTO: 274 10E3/UL (ref 150–450)
POTASSIUM SERPL-SCNC: 3.8 MMOL/L (ref 3.4–5.3)
PROT SERPL-MCNC: 7.8 G/DL (ref 6.4–8.3)
RBC # BLD AUTO: 4.83 10E6/UL (ref 3.8–5.2)
RSV RNA SPEC NAA+PROBE: NEGATIVE
SARS-COV-2 RNA RESP QL NAA+PROBE: NEGATIVE
SODIUM SERPL-SCNC: 141 MMOL/L (ref 135–145)
WBC # BLD AUTO: 6 10E3/UL (ref 4–11)

## 2024-09-05 PROCEDURE — 82248 BILIRUBIN DIRECT: CPT | Performed by: STUDENT IN AN ORGANIZED HEALTH CARE EDUCATION/TRAINING PROGRAM

## 2024-09-05 PROCEDURE — 99285 EMERGENCY DEPT VISIT HI MDM: CPT | Mod: 25

## 2024-09-05 PROCEDURE — 87637 SARSCOV2&INF A&B&RSV AMP PRB: CPT | Performed by: STUDENT IN AN ORGANIZED HEALTH CARE EDUCATION/TRAINING PROGRAM

## 2024-09-05 PROCEDURE — 87651 STREP A DNA AMP PROBE: CPT | Performed by: STUDENT IN AN ORGANIZED HEALTH CARE EDUCATION/TRAINING PROGRAM

## 2024-09-05 PROCEDURE — 250N000011 HC RX IP 250 OP 636: Performed by: STUDENT IN AN ORGANIZED HEALTH CARE EDUCATION/TRAINING PROGRAM

## 2024-09-05 PROCEDURE — 70496 CT ANGIOGRAPHY HEAD: CPT

## 2024-09-05 PROCEDURE — 250N000013 HC RX MED GY IP 250 OP 250 PS 637: Performed by: STUDENT IN AN ORGANIZED HEALTH CARE EDUCATION/TRAINING PROGRAM

## 2024-09-05 PROCEDURE — 70498 CT ANGIOGRAPHY NECK: CPT

## 2024-09-05 PROCEDURE — 85025 COMPLETE CBC W/AUTO DIFF WBC: CPT | Performed by: STUDENT IN AN ORGANIZED HEALTH CARE EDUCATION/TRAINING PROGRAM

## 2024-09-05 PROCEDURE — 83690 ASSAY OF LIPASE: CPT | Performed by: STUDENT IN AN ORGANIZED HEALTH CARE EDUCATION/TRAINING PROGRAM

## 2024-09-05 PROCEDURE — 84703 CHORIONIC GONADOTROPIN ASSAY: CPT | Performed by: STUDENT IN AN ORGANIZED HEALTH CARE EDUCATION/TRAINING PROGRAM

## 2024-09-05 PROCEDURE — 36415 COLL VENOUS BLD VENIPUNCTURE: CPT | Performed by: STUDENT IN AN ORGANIZED HEALTH CARE EDUCATION/TRAINING PROGRAM

## 2024-09-05 RX ORDER — IOPAMIDOL 755 MG/ML
75 INJECTION, SOLUTION INTRAVASCULAR ONCE
Status: DISCONTINUED | OUTPATIENT
Start: 2024-09-05 | End: 2024-09-05

## 2024-09-05 RX ORDER — IOPAMIDOL 755 MG/ML
75 INJECTION, SOLUTION INTRAVASCULAR ONCE
Status: COMPLETED | OUTPATIENT
Start: 2024-09-05 | End: 2024-09-05

## 2024-09-05 RX ORDER — MAGNESIUM HYDROXIDE/ALUMINUM HYDROXICE/SIMETHICONE 120; 1200; 1200 MG/30ML; MG/30ML; MG/30ML
30 SUSPENSION ORAL ONCE
Status: COMPLETED | OUTPATIENT
Start: 2024-09-05 | End: 2024-09-05

## 2024-09-05 RX ADMIN — ALUMINUM HYDROXIDE, MAGNESIUM HYDROXIDE, AND SIMETHICONE 30 ML: 200; 200; 20 SUSPENSION ORAL at 20:07

## 2024-09-05 RX ADMIN — IOPAMIDOL 75 ML: 755 INJECTION, SOLUTION INTRAVENOUS at 21:27

## 2024-09-05 ASSESSMENT — ACTIVITIES OF DAILY LIVING (ADL)
ADLS_ACUITY_SCORE: 33
ADLS_ACUITY_SCORE: 35
ADLS_ACUITY_SCORE: 33

## 2024-09-05 NOTE — ED TRIAGE NOTES
"Arrives to ED with c/o throat irritation. Began after eating mac n cheese yesterday. \"I feels like something is in there\". Airway patent. Able to drink water. Also reports rash to lumbar spine that began today. Had rash previously, resolved and now has reappeared.      Triage Assessment (Adult)       Row Name 09/05/24 3582          Triage Assessment    Airway WDL X  \"it feels like somthing is in there\"        Respiratory WDL    Respiratory WDL WDL        Skin Circulation/Temperature WDL    Skin Circulation/Temperature WDL WDL        Cardiac WDL    Cardiac WDL WDL        Peripheral/Neurovascular WDL    Peripheral Neurovascular WDL WDL        Cognitive/Neuro/Behavioral WDL    Cognitive/Neuro/Behavioral WDL WDL                     "

## 2024-09-06 VITALS
SYSTOLIC BLOOD PRESSURE: 141 MMHG | DIASTOLIC BLOOD PRESSURE: 75 MMHG | HEART RATE: 71 BPM | TEMPERATURE: 98.1 F | BODY MASS INDEX: 25.86 KG/M2 | WEIGHT: 160.9 LBS | OXYGEN SATURATION: 100 % | HEIGHT: 66 IN | RESPIRATION RATE: 16 BRPM

## 2024-09-06 NOTE — ED NOTES
Discharge instructions discussed with patient and all questions answered. Pt agreeable with discharge plan of care. VSS. Pt ambulatory at discharge without any difficulty.

## 2024-09-06 NOTE — DISCHARGE INSTRUCTIONS
As we discussed, please continue the omeprazole as previously prescribed and keep your follow-up appointment with GI.  If at any point you develop fever, inability to swallow, drooling, inability to tolerate liquids/solids please return to the ER for further evaluation.      Your blood pressure was elevated in the emergencydepartment today and requires recheck and close follow-up in your primary care clinic. Untreated blood pressure can cause serious complications including, but not limited to stroke, heart attack/failure, and kidney disease.  Please make a close follow-up appointment to have this recheck performed. Please return to the emergency department immediately if you develop a severe headache, vision changes, chest pain, shortness of breath, orabdominal pain.

## 2024-09-06 NOTE — ED PROVIDER NOTES
eMERGENCY dEPARTMENT PROGRESS NOTE         ED COURSE AND MEDICAL DECISION MAKING  Patient was signed out to me by Araceli Paulino PA-C at 9:00 PM  pending labs, swabs, and CTA.   10:35 PM I discussed results, discharge, follow-up, and reasons to return to the emergency department patient.  She passed p.o. challenge..    Marine Andrews is a 34 year old female who presented to the ED for evaluation of globus sensation after eating mac & cheese with sausage yesterday.  Patient's care was signed out to me at the end of my colleague shift.  Please see her note for further detail and medical decision making.  Strep and viral swab negative.  Labs unremarkable.  CTA of her head and neck returned reassuring -no evidence of aneurysm, dissection, esophageal perforation, or deep soft tissue space infection. Patient tolerating secretions, taking in PO without difficulty, and has normal speech in the department. Nothing to suggest active esophageal obstruction or sinister throat infection. Per Chart Review, she has been twice in the last ~1.5 for similar symptoms. Discussed with patient, and she has upcoming appointment with [facility] in October for upper endoscopy which I certainly feel is warranted.  She may have esophageal stricture, GERD, eosinophilic esophagitis, etc. she was also given follow-up information for [facility] in case they are able to get her in for endoscopy sooner.  She had previously been prescribed omeprazole though has not been taking this.  Advised to restart this medication.  Reviewed concerning signs and symptoms return to the ED and she verbalized understanding.  Discharged home in good condition.    At the conclusion of the encounter I discussed the results of all of the tests and the disposition. The questions were answered. The patient or family acknowledged understanding and was agreeable with the care plan.     LAB  Pertinent labs results reviewed   Results for orders placed or performed during  the hospital encounter of 09/05/24   CTA Head Neck with Contrast    Impression    IMPRESSION:   HEAD CT:  1.  No acute intracranial process.    HEAD CTA:   1.  Normal CTA [place] [facility].    NECK CTA:  1.  Normal neck CTA.   Symptomatic Influenza A/B, RSV, & SARS-CoV2 PCR (COVID-19) Nasopharyngeal    Specimen: Nasopharyngeal; Swab   Result Value Ref Range    Influenza A PCR Negative Negative    Influenza B PCR Negative Negative    RSV PCR Negative Negative    SARS CoV2 PCR Negative Negative   HCG QUALitative pregnancy (blood)   Result Value Ref Range    hCG Serum Qualitative Negative Negative   Basic metabolic panel   Result Value Ref Range    Sodium 141 135 - 145 mmol/L    Potassium 3.8 3.4 - 5.3 mmol/L    Chloride 103 98 - 107 mmol/L    Carbon Dioxide (CO2) 24 22 - 29 mmol/L    Anion Gap 14 7 - 15 mmol/L    Urea Nitrogen <1.4 (L) 6.0 - 20.0 mg/dL    Creatinine 0.59 0.51 - 0.95 mg/dL    GFR Estimate >90 >60 mL/min/1.73m2    Calcium 9.1 8.8 - 10.4 mg/dL    Glucose 91 70 - 99 mg/dL   Hepatic function panel   Result Value Ref Range    Protein Total 7.8 6.4 - 8.3 g/dL    Albumin 4.7 3.5 - 5.2 g/dL    Bilirubin Total 0.6 <=1.2 mg/dL    Alkaline Phosphatase 88 40 - 150 U/L    AST 19 0 - 45 U/L    ALT 20 0 - 50 U/L    Bilirubin Direct <0.20 0.00 - 0.30 mg/dL   Result Value Ref Range    Lipase 17 13 - 60 U/L   CBC with platelets and differential   Result Value Ref Range    WBC Count 6.0 4.0 - 11.0 10e3/uL    RBC Count 4.83 3.80 - 5.20 10e6/uL    Hemoglobin 14.1 11.7 - 15.7 g/dL    Hematocrit 40.7 35.0 - 47.0 %    MCV 84 78 - 100 fL    MCH 29.2 26.5 - 33.0 pg    MCHC 34.6 31.5 - 36.5 g/dL    RDW 12.7 10.0 - 15.0 %    Platelet Count 274 150 - 450 10e3/uL    % Neutrophils 53 %    % Lymphocytes 34 %    % Monocytes 10 %    % Eosinophils 2 %    % Basophils 1 %    % Immature Granulocytes 0 %    NRBCs per 100 WBC 0 <1 /100    Absolute Neutrophils 3.1 1.6 - 8.3 10e3/uL    Absolute Lymphocytes 2.0 0.8 - 5.3 10e3/uL    Absolute  Monocytes 0.6 0.0 - 1.3 10e3/uL    Absolute Eosinophils 0.1 0.0 - 0.7 10e3/uL    Absolute Basophils 0.1 0.0 - 0.2 10e3/uL    Absolute Immature Granulocytes 0.0 <=0.4 10e3/uL    Absolute NRBCs 0.0 10e3/uL   Group A Streptococcus PCR Throat Swab    Specimen: Throat; Swab   Result Value Ref Range    Group A strep by PCR Not Detected Not Detected         RADIOLOGY    Pertinent imaging reviewed   Please see official radiology report.  CTA Head Neck with Contrast   Final Result   IMPRESSION:    HEAD CT:   1.  No acute intracranial process.      HEAD CTA:    1.  Normal CTA [place] [facility].      NECK CTA:   1.  Normal neck CTA.          FINAL IMPRESSION    1. Neck pain on left side    2. Esophageal dysphagia         DISCHARGE PRESCRIPTIONS  New Prescriptions    No medications on file       TONY Fulton PA-C, ED  9/5/2024     Kay House PA-C  09/05/24 7901

## 2024-09-06 NOTE — ED NOTES
Pt given apple juice and was able to tolerate. Drank 16oz without any difficulty. After drinking fluids breaths are regular and speech is clear and concise.

## 2024-09-06 NOTE — ED PROVIDER NOTES
"Emergency Department Encounter   NAME: Marine Andrews ; AGE: 34 year old female ; YOB: 1990 ; MRN: [id number] ; PCP: Kya Reynaga   ED PROVIDER: Araceli Paulino PA-C    Evaluation Date & Time:   No admission date for patient encounter.    CHIEF COMPLAINT:  Pharyngitis and Rash        Impression and Plan   FINAL IMPRESSION:    ICD-10-CM    1. Neck pain on left side  M54.2       2. Esophageal dysphagia  R13.19           MDM:  Marine is 34 year old female with PMH of allergic rhinitis and impetigo presenting to the emergency department for evaluation of throat irritation.  She states it began after she was eating mac & cheese with sausage yesterday.  She denies choking on any food or sensation of getting food stuck in her throat but shortly following she had the sensation that food was stuck in her throat.  She states this has not gone away since.  She states it is mostly just annoying but at times she does have left-sided neck pain that can feel \"pulsatile\". She is able to swallow food and water without emesis following.  She is tolerating her oral secretions.  She also endorses a posterior headache that began around the same time as the neck pain.  She denies any vision changes, slurred speech, facial asymmetry, or weakness.  She also endorses epigastric pain that started today and occasional feelings of nausea, no vomiting.  No coffee-ground emesis.  Patient also states that she has a small quarter sized rash on the middle of her back that was itchy yesterday. She states she has had this several times before, her primary care provider did not know what it was.  Patient is currently on daily omeprazole and states she has been taking this as prescribed.    Per chart review, patient was seen in the ED on 8/25/2024 for evaluation of a globus sensation after eating a taco.  She was also seen 7/24/2024 for evaluation of esophageal dysphagia and sensation of a foreign body in her throat.    Vitals " reviewed and unremarkable, she is afebrile temp 98.1 F.  /91. On exam she is resting comfortably, nontoxic-appearing. Tolerating oral secretions. No distress. differential diagnosis includes but not limited to globus sensation, COVID, viral URI, strep pharyngitis, meningitis, to carotid/vertebral artery dissection, subarachnoid hemorrhage.  On exam she has mild tenderness to the left anterior aspect of the neck.  No signs of trauma. Her neurologic exam is without any focal deficits. Walking gait is not antalgic.  Her posterior pharynx is not erythematous or edematous.  No exudates.  No foreign bodies. She is tolerating food and water as well as her oral secretions. No indications for emergent upper endoscopy tonight.  All lab work is unremarkable today.  COVID and strep swabs are negative.  There is a quarter sized to light pink rash present on her mid back.  There are no vesicles or bullae.  No signs of SJS.  Does not appear consistent with shingles.  I think this looks like it may be eczema she states it is itchy at times.    Given she is endorsing left-sided anterior neck pain with associated headache we will proceed with CTA head and neck to rule out dissection.  She was signed out to [facility] pending CT results. If this is negative I suspect patient will be able to discharge home with GI cocktail and plan for her to continue her omeprazole. Given patient has been seen multiple times for esophageal dysphagia in the past she does have upper endoscopy scheduled in a month or so for further evaluation.  I recommended she call to see if she can get this scheduled for sooner.  Patient was educated on concerning symptoms or return to the emergency department and is understanding and agreeable to this plan.        Medical Decision Making  Obtained supplemental history:Supplemental history obtained?: No  Reviewed external records: External records reviewed?: Documented in chart and Inpatient Record: I  "personally performed chart review of ED visit from 7/20/2024 and 8/25/2024  Care impacted by chronic illness:N/A  Care significantly affected by social determinants of health:N/A  Did you consider but not order tests?: Work up considered but not performed and documented in chart, if applicable  Did you interpret images independently?: Independent interpretation of ECG and images noted in documentation, when applicable.  Consultation discussion with other provider:Did you involve another provider (consultant, , pharmacy, etc.)?: No  Disposition pending at the time of sign out        ED COURSE:  7:33 PM I met and introduced myself to the patient. I gathered initial history and performed my physical exam. We discussed plan for initial workup.   8:30 PM patient signed out to oncoming PA pending imaging    At the conclusion of the encounter I discussed the results of all the tests and the disposition. The questions were answered. The patient or family acknowledged understanding and was agreeable with the care plan.          MEDICATIONS GIVEN IN THE EMERGENCY DEPARTMENT:  Medications   alum & mag hydroxide-simethicone (MAALOX) suspension 30 mL (30 mLs Oral $Given 9/5/24 2007)         NEW PRESCRIPTIONS STARTED AT TODAY'S ED VISIT:  New Prescriptions    No medications on file         HPI   Patient information was obtained from: patient  Use of Intrepreter: N/A     Marine is 34 year old female with PMH of allergic rhinitis and impetigo presenting to the emergency department for evaluation of throat irritation.  She states it began after she was eating mac & cheese with sausage yesterday.  She denies choking on any food or sensation of getting food stuck in her throat but shortly following she had the sensation that food was stuck in her throat.  She states this has not gone away since.  She states it is mostly just annoying but at times she does have left-sided neck pain that can feel \"pulsatile\". She is able to swallow " food and water without emesis following.  She is tolerating her oral secretions.  She also endorses a posterior headache that began around the same time as the neck pain.  She denies any vision changes, slurred speech, facial asymmetry, or weakness.  She also endorses epigastric pain that started today and occasional feelings of nausea, no vomiting.  No coffee-ground emesis.  Patient also states that she has a small quarter sized rash on the middle of her back that was itchy yesterday. She states she has had this several times before, her primary care provider did not know what it was.  Patient is currently on daily omeprazole and states she has been taking this as prescribed.    Per chart review, patient was seen in the ED on 8/25/2024 for evaluation of a globus sensation after eating a taco.  She was also seen 7/24/2024 for evaluation of esophageal dysphagia and sensation of a foreign body in her throat.        Medical History     Past Medical History:   Diagnosis Date    Anxiety     Depressive disorder        Past Surgical History:   Procedure Laterality Date    TUBAL LIGATION Right        Family History   Problem Relation Age of Onset    Family History Negative Mother     Family History Negative Father     Family History Negative Maternal Grandmother     Family History Negative Maternal Grandfather     Cancer Paternal Grandmother         Esophageal cancer    Family History Negative Paternal Grandfather     Ulcers Mother     Ulcers Maternal Grandmother        Social History     Tobacco Use    Smoking status: Never    Smokeless tobacco: Never    Tobacco comments:     dad smokes   Substance Use Topics    Alcohol use: No    Drug use: No       acyclovir (ZOVIRAX) 200 MG/5ML suspension  cyclobenzaprine (FLEXERIL) 5 MG tablet  hydrOXYzine HCl (ATARAX) 25 MG tablet  methylPREDNISolone (MEDROL DOSEPAK) 4 MG tablet therapy pack  omeprazole (PRILOSEC) 20 MG DR capsule  tiZANidine (ZANAFLEX) 2 MG tablet  triamcinolone  "(KENALOG) 0.1 % external ointment          Physical Exam     First Vitals:  Patient Vitals for the past 24 hrs:   BP Temp Temp src Pulse Resp SpO2 Height Weight   09/05/24 1856 (!) 139/91 98.1  F (36.7  C) Temporal 70 18 98 % 1.676 m (5' 6\") 73 kg (160 lb 14.4 oz)         PHYSICAL EXAM    General Appearance:  Alert, cooperative, no distress, appears stated age.  Resting comfortably, nontoxic-appearing  HENT: Normocephalic without obvious deformity, atraumatic. Mucous membranes moist.  Tolerating her oral secretions without difficulty. No swelling of the face or neck.  No erythema or edema of the posterior pharynx.  No tonsillar swelling.  No exudates.  No coffee-ground emesis.  No visible foreign bodies in throat.  Eyes: Conjunctiva clear, Lids normal. No discharge.   Respiratory: No distress. Lungs clear to ausculation bilaterally. No wheezes, rhonchi or stridor  Cardiovascular: Regular rate and rhythm, no murmur. Normal cap refill. No peripheral edema  GI: Abdomen soft, nontender  Musculoskeletal: Moving all extremities. No gross deformities  Integument: Warm, dry.  There is a very light pink quarter sized rash present on the mid back.  No vesicles.  No bullae.  No marks of excoriation.  Neurologic: Alert and orientated x3. No focal deficits.  Psych: Normal mood and affect        Results     LAB:  All pertinent labs reviewed and interpreted  Labs Ordered and Resulted from Time of ED Arrival to Time of ED Departure   HEPATIC FUNCTION PANEL - Normal       Result Value    Protein Total 7.8      Albumin 4.7      Bilirubin Total 0.6      Alkaline Phosphatase 88      AST 19      ALT 20      Bilirubin Direct <0.20     LIPASE - Normal    Lipase 17     GROUP A STREPTOCOCCUS PCR THROAT SWAB - Normal    Group A strep by PCR Not Detected     CBC WITH PLATELETS AND DIFFERENTIAL    WBC Count 6.0      RBC Count 4.83      Hemoglobin 14.1      Hematocrit 40.7      MCV 84      MCH 29.2      MCHC 34.6      RDW 12.7      Platelet " Count 274      % Neutrophils 53      % Lymphocytes 34      % Monocytes 10      % Eosinophils 2      % Basophils 1      % Immature Granulocytes 0      NRBCs per 100 WBC 0      Absolute Neutrophils 3.1      Absolute Lymphocytes 2.0      Absolute Monocytes 0.6      Absolute Eosinophils 0.1      Absolute Basophils 0.1      Absolute Immature Granulocytes 0.0      Absolute NRBCs 0.0     INFLUENZA A/B, RSV, & SARS-COV2 PCR   HCG QUALITATIVE PREGNANCY   BASIC METABOLIC PANEL       RADIOLOGY:  CTA Head Neck with Contrast    (Results Pending)         ECG:  N/A      PROCEDURES:  N/A      Araceli Paulino PA-C   Emergency Medicine   [facility] EMERGENCY ROOM       Araceli Paulino PA-C  09/05/24 1247

## 2024-09-14 ENCOUNTER — HOSPITAL ENCOUNTER (EMERGENCY)
Facility: CLINIC | Age: 34
Discharge: HOME | End: 2024-09-14
Attending: EMERGENCY MEDICINE | Admitting: EMERGENCY MEDICINE

## 2024-09-14 VITALS
SYSTOLIC BLOOD PRESSURE: 127 MMHG | BODY MASS INDEX: 25.82 KG/M2 | OXYGEN SATURATION: 99 % | DIASTOLIC BLOOD PRESSURE: 75 MMHG | TEMPERATURE: 99.4 F | WEIGHT: 160 LBS | HEART RATE: 70 BPM | RESPIRATION RATE: 16 BRPM

## 2024-09-14 DIAGNOSIS — E87.6 HYPOKALEMIA: ICD-10-CM

## 2024-09-14 DIAGNOSIS — R09.A2 GLOBUS SENSATION: ICD-10-CM

## 2024-09-14 DIAGNOSIS — M54.2 NECK PAIN: ICD-10-CM

## 2024-09-14 LAB
ANION GAP SERPL CALCULATED.3IONS-SCNC: 8 MMOL/L (ref 7–15)
ATRIAL RATE - MUSE: 81 BPM
BASOPHILS # BLD AUTO: 0.1 10E3/UL (ref 0–0.2)
BASOPHILS NFR BLD AUTO: 1 %
BUN SERPL-MCNC: <1.4 MG/DL (ref 6–20)
CALCIUM SERPL-MCNC: 9.1 MG/DL (ref 8.8–10.4)
CHLORIDE SERPL-SCNC: 103 MMOL/L (ref 98–107)
CREAT SERPL-MCNC: 0.59 MG/DL (ref 0.51–0.95)
CRP SERPL-MCNC: <3 MG/L
DIASTOLIC BLOOD PRESSURE - MUSE: NORMAL MMHG
EGFRCR SERPLBLD CKD-EPI 2021: >90 ML/MIN/1.73M2
EOSINOPHIL # BLD AUTO: 0.1 10E3/UL (ref 0–0.7)
EOSINOPHIL NFR BLD AUTO: 1 %
ERYTHROCYTE [DISTWIDTH] IN BLOOD BY AUTOMATED COUNT: 12.7 % (ref 10–15)
GLUCOSE SERPL-MCNC: 96 MG/DL (ref 70–99)
HCO3 SERPL-SCNC: 28 MMOL/L (ref 22–29)
HCT VFR BLD AUTO: 35.9 % (ref 35–47)
HGB BLD-MCNC: 12.6 G/DL (ref 11.7–15.7)
HOLD SPECIMEN: NORMAL
IMM GRANULOCYTES # BLD: 0 10E3/UL
IMM GRANULOCYTES NFR BLD: 0 %
INTERPRETATION ECG - MUSE: NORMAL
LYMPHOCYTES # BLD AUTO: 1.5 10E3/UL (ref 0.8–5.3)
LYMPHOCYTES NFR BLD AUTO: 28 %
MAGNESIUM SERPL-MCNC: 1.8 MG/DL (ref 1.7–2.3)
MCH RBC QN AUTO: 29.3 PG (ref 26.5–33)
MCHC RBC AUTO-ENTMCNC: 35.1 G/DL (ref 31.5–36.5)
MCV RBC AUTO: 84 FL (ref 78–100)
MONOCYTES # BLD AUTO: 0.5 10E3/UL (ref 0–1.3)
MONOCYTES NFR BLD AUTO: 9 %
NEUTROPHILS # BLD AUTO: 3.3 10E3/UL (ref 1.6–8.3)
NEUTROPHILS NFR BLD AUTO: 61 %
NRBC # BLD AUTO: 0 10E3/UL
NRBC BLD AUTO-RTO: 0 /100
P AXIS - MUSE: 62 DEGREES
PLATELET # BLD AUTO: 202 10E3/UL (ref 150–450)
POTASSIUM SERPL-SCNC: 2.9 MMOL/L (ref 3.4–5.3)
PR INTERVAL - MUSE: 140 MS
QRS DURATION - MUSE: 80 MS
QT - MUSE: 364 MS
QTC - MUSE: 422 MS
R AXIS - MUSE: 75 DEGREES
RBC # BLD AUTO: 4.3 10E6/UL (ref 3.8–5.2)
SODIUM SERPL-SCNC: 139 MMOL/L (ref 135–145)
SYSTOLIC BLOOD PRESSURE - MUSE: NORMAL MMHG
T AXIS - MUSE: 52 DEGREES
VENTRICULAR RATE- MUSE: 81 BPM
WBC # BLD AUTO: 5.4 10E3/UL (ref 4–11)

## 2024-09-14 PROCEDURE — 80048 BASIC METABOLIC PNL TOTAL CA: CPT | Performed by: EMERGENCY MEDICINE

## 2024-09-14 PROCEDURE — 83735 ASSAY OF MAGNESIUM: CPT | Performed by: EMERGENCY MEDICINE

## 2024-09-14 PROCEDURE — 99284 EMERGENCY DEPT VISIT MOD MDM: CPT

## 2024-09-14 PROCEDURE — 36415 COLL VENOUS BLD VENIPUNCTURE: CPT | Performed by: EMERGENCY MEDICINE

## 2024-09-14 PROCEDURE — 86140 C-REACTIVE PROTEIN: CPT | Performed by: EMERGENCY MEDICINE

## 2024-09-14 PROCEDURE — 85025 COMPLETE CBC W/AUTO DIFF WBC: CPT | Performed by: EMERGENCY MEDICINE

## 2024-09-14 PROCEDURE — 250N000013 HC RX MED GY IP 250 OP 250 PS 637: Performed by: EMERGENCY MEDICINE

## 2024-09-14 PROCEDURE — 93005 ELECTROCARDIOGRAM TRACING: CPT | Performed by: EMERGENCY MEDICINE

## 2024-09-14 RX ORDER — LIDOCAINE 4 G/G
1 PATCH TOPICAL EVERY 24 HOURS
Qty: 15 PATCH | Refills: 0 | Status: SHIPPED | OUTPATIENT
Start: 2024-09-14

## 2024-09-14 RX ORDER — KETOROLAC TROMETHAMINE 15 MG/ML
15 INJECTION, SOLUTION INTRAMUSCULAR; INTRAVENOUS ONCE
Status: DISCONTINUED | OUTPATIENT
Start: 2024-09-14 | End: 2024-09-14

## 2024-09-14 RX ORDER — GABAPENTIN 100 MG/1
100 CAPSULE ORAL 3 TIMES DAILY
Qty: 42 CAPSULE | Refills: 0 | Status: SHIPPED | OUTPATIENT
Start: 2024-09-14 | End: 2024-09-28

## 2024-09-14 RX ORDER — MAGNESIUM HYDROXIDE/ALUMINUM HYDROXICE/SIMETHICONE 120; 1200; 1200 MG/30ML; MG/30ML; MG/30ML
15 SUSPENSION ORAL ONCE
Status: COMPLETED | OUTPATIENT
Start: 2024-09-14 | End: 2024-09-14

## 2024-09-14 RX ORDER — POTASSIUM CHLORIDE 1.5 G/1.58G
40 POWDER, FOR SOLUTION ORAL ONCE
Status: COMPLETED | OUTPATIENT
Start: 2024-09-14 | End: 2024-09-14

## 2024-09-14 RX ORDER — POTASSIUM CHLORIDE 1.5 G/1.58G
40 POWDER, FOR SOLUTION ORAL DAILY
Qty: 14 PACKET | Refills: 0 | Status: SHIPPED | OUTPATIENT
Start: 2024-09-14 | End: 2024-09-21

## 2024-09-14 RX ORDER — METHYLPREDNISOLONE 4 MG
TABLET, DOSE PACK ORAL
Qty: 21 TABLET | Refills: 0 | Status: SHIPPED | OUTPATIENT
Start: 2024-09-14

## 2024-09-14 RX ADMIN — POTASSIUM CHLORIDE 40 MEQ: 1.5 POWDER, FOR SOLUTION ORAL at 22:48

## 2024-09-14 RX ADMIN — ALUMINUM HYDROXIDE, MAGNESIUM HYDROXIDE, AND SIMETHICONE 15 ML: 1200; 120; 1200 SUSPENSION ORAL at 21:21

## 2024-09-14 ASSESSMENT — COLUMBIA-SUICIDE SEVERITY RATING SCALE - C-SSRS
2. HAVE YOU ACTUALLY HAD ANY THOUGHTS OF KILLING YOURSELF IN THE PAST MONTH?: NO
6. HAVE YOU EVER DONE ANYTHING, STARTED TO DO ANYTHING, OR PREPARED TO DO ANYTHING TO END YOUR LIFE?: NO
1. IN THE PAST MONTH, HAVE YOU WISHED YOU WERE DEAD OR WISHED YOU COULD GO TO SLEEP AND NOT WAKE UP?: NO

## 2024-09-14 ASSESSMENT — ACTIVITIES OF DAILY LIVING (ADL)
ADLS_ACUITY_SCORE: 35

## 2024-09-15 NOTE — ED TRIAGE NOTES
Pt arrives to ED with c/o ongoing worsening chest pain and shortness of breath for the past week that got worse over the past couple of days. Pt also feels like her throat is closing or burning. Pt states right side neck pain. Appears anxious. Has been seen recently here for similar symptoms.      Triage Assessment (Adult)       Row Name 09/14/24 2023          Triage Assessment    Airway WDL WDL        Respiratory WDL    Respiratory WDL X;rhythm/pattern     Rhythm/Pattern, Respiratory shortness of breath        Skin Circulation/Temperature WDL    Skin Circulation/Temperature WDL WDL        Cardiac WDL    Cardiac WDL X;chest pain        Chest Pain Assessment    Chest Pain Location anterior chest, right     Character pressure     Chest Pain Intervention 12-lead ECG obtained        Peripheral/Neurovascular WDL    Peripheral Neurovascular WDL WDL        Cognitive/Neuro/Behavioral WDL    Cognitive/Neuro/Behavioral WDL WDL

## 2024-09-15 NOTE — ED PROVIDER NOTES
EMERGENCY DEPARTMENT ENCOUNTER      NAME: Marine Andrews  AGE: 34 year old female  YOB: 1990  MRN: [id number]  EVALUATION DATE & TIME: 9/14/2024  8:27 PM    PCP: Kya Reynaga    ED PROVIDER: Apolonia Sam MD      Chief Complaint   Patient presents with    Chest Pain    Shortness of Breath         FINAL IMPRESSION:  1. Neck pain    2. Hypokalemia    3. Globus sensation          ED COURSE & MEDICAL DECISION MAKING:    Pertinent Labs & Imaging studies reviewed. (See chart for details)  34 year old female presents to the Emergency Department for evaluation of globus sensation that has been ongoing for months.,  Over the last week the patient has developed a burning sensation on the right side of her neck.  This is prevented her from sleeping.  She states that this has been difficult for her to eat.  She is swallowing her secretions, she is in no respiratory distress.  She is moving her neck without difficulty.  Per chart review, the patient had a CTA of her head and neck about a week ago.  This was normal.  She has normal range of motion.  No palpable mass on her exam.  Laboratory studies with potassium of 2.9.  Patient with no EKG changes.  This is otherwise asymptomatic.  This is likely secondary to her recent decreased oral intake.  Patient was given a dose of oral potassium without difficulty.  Magnesium was normal.  Will discharge the patient home with oral potassium, supportive management with gabapentin, Lidoderm patches.  Patient also reports that she was previously on steroids, per chart review this was about 2 months ago.  She reports that this was helpful for her symptoms.  Will place the patient on another course of steroids.  I encouraged her to follow-up closely with her primary care provider in the next 3 to 5 days for repeat potassium and we will put in a referral for ENT as well.  Encouraged to follow-up with GI as already scheduled.    At the conclusion of the encounter  I discussed the results of all of the tests and the disposition. The questions were answered. The patient or family acknowledged understanding and was agreeable with the care plan.       Medical Decision Making  Obtained supplemental history:Supplemental history obtained?: No  Reviewed external records: External records reviewed?: No  Care impacted by chronic illness:N/A  Care significantly affected by social determinants of health:Access to Medical Care  Did you consider but not order tests?: Work up considered but not performed and documented in chart, if applicable  Did you interpret images independently?: Independent interpretation of ECG and images noted in documentation, when applicable.  Consultation discussion with other provider:Did you involve another provider (consultant, , pharmacy, etc.)?: No  Admission considered but the patient had a stable EKG, was otherwise asymptomatic with hypokalemia.  Was able to tolerate oral potassium.  Will discharge the patient home on oral potassium and recommend close follow-up with her primary care provider      MEDICATIONS GIVEN IN THE EMERGENCY:  Medications   alum & mag hydroxide-simethicone (MAALOX) suspension 15 mL (15 mLs Oral $Given 9/14/24 2121)   potassium chloride (KLOR-CON) Packet 40 mEq (40 mEq Oral $Given 9/14/24 2248)       NEW PRESCRIPTIONS STARTED AT TODAY'S ER VISIT  New Prescriptions    GABAPENTIN (NEURONTIN) 100 MG CAPSULE    Take 1 capsule (100 mg) by mouth 3 times daily for 14 days.    LIDOCAINE (LIDOCARE) 4 % PATCH    Place 1 patch over 12 hours onto the skin every 24 hours. To prevent lidocaine toxicity, patient should be patch free for 12 hrs daily.    POTASSIUM CHLORIDE (KLOR-CON) 20 MEQ PACKET    Take 40 mEq by mouth daily for 7 days.          =================================================================    HPI    Patient information was obtained from: Patient    Use of : N/A         Marine Andrews is a 34 year old female who  presents to this ED for evaluation of right sided neck pain that she describes as a persistent burning sensation.  She states about a week ago she was eating mac & cheese with sausage when she suddenly felt like something lodged in the right side of her throat.  She states that she has noticed a persistent lump.  She has been having difficulty with sensation of throat tightening over the last month.  She states that she can swallow her secretions, is swallowing fluids, will take a couple bites of food but is no longer interested secondary to the pain.  The burning sensation in the right side of her neck prevents her from sleeping.  She does not have any chest pain or shortness of breath.  She is also undergoing workup for injury to her neck and right shoulder.       PAST MEDICAL HISTORY:  Past Medical History:   Diagnosis Date    Anxiety     Depressive disorder        PAST SURGICAL HISTORY:  Past Surgical History:   Procedure Laterality Date    TUBAL LIGATION Right            CURRENT MEDICATIONS:    gabapentin (NEURONTIN) 100 MG capsule  Lidocaine (LIDOCARE) 4 % Patch  potassium chloride (KLOR-CON) 20 MEQ packet  acyclovir (ZOVIRAX) 200 MG/5ML suspension  cyclobenzaprine (FLEXERIL) 5 MG tablet  hydrOXYzine HCl (ATARAX) 25 MG tablet  methylPREDNISolone (MEDROL DOSEPAK) 4 MG tablet therapy pack  omeprazole (PRILOSEC) 20 MG DR capsule  tiZANidine (ZANAFLEX) 2 MG tablet  triamcinolone (KENALOG) 0.1 % external ointment        ALLERGIES:  Allergies   Allergen Reactions    Ceclor [Cefaclor] Rash       FAMILY HISTORY:  Family History   Problem Relation Age of Onset    Family History Negative Mother     Family History Negative Father     Family History Negative Maternal Grandmother     Family History Negative Maternal Grandfather     Cancer Paternal Grandmother         Esophageal cancer    Family History Negative Paternal Grandfather     Ulcers Mother     Ulcers Maternal Grandmother        SOCIAL HISTORY:   Social History  "    Socioeconomic History    Marital status: Single     Spouse name: None    Number of children: None    Years of education: None    Highest education level: None   Tobacco Use    Smoking status: Never    Smokeless tobacco: Never    Tobacco comments:     dad smokes   Substance and Sexual Activity    Alcohol use: No    Drug use: No    Sexual activity: Yes     Partners: Male     Comment: states \"using protection\"     Social Determinants of Health     Financial Resource Strain: Low Risk  (7/7/2024)    Received from [facility]    Financial Resource Strain     Difficulty of Paying Living Expenses: 3   Food Insecurity: No Food Insecurity (7/7/2024)    Received from [facility]    Food Insecurity     Worried About Running Out of Food in the Last Year: 1   Transportation Needs: No Transportation Needs (7/7/2024)    Received from [facility]    Transportation Needs     Lack of Transportation (Medical): 1   Social Connections: Socially Integrated (7/7/2024)    Received from [facility]    Social Connections     Frequency of Communication with Friends and Family: 0   Housing Stability: Low Risk  (7/7/2024)    [facility]    Housing Stability     Unable to Pay for Housing in the Last Year: 1       VITALS:  BP (!) 158/95   Pulse 85   Temp 97.3  F (36.3  C) (Temporal)   Resp 16   Wt 72.6 kg (160 lb)   SpO2 100%   BMI 25.82 kg/m      PHYSICAL EXAM    Constitutional: Well developed, Well nourished, NAD  HENT: Normocephalic, Atraumatic, Bilateral external ears normal, Oropharynx normal, mucous membranes moist, Nose normal. Handling secretions, no swelling, induration, crepitus in the right side of the neck. No erythema.   Neck- Normal range of motion, No tenderness, Supple, No stridor.  Eyes: PERRL, EOMI, Conjunctiva normal, No discharge. "   Respiratory: Normal breath sounds, No respiratory distress  Cardiovascular: Normal heart rate, Regular rhythm  GI: Bowel sounds normal, Soft, No tenderness,   Musculoskeletal: No edema. Good range of motion in all major joints. No tenderness to palpation or major deformities noted.   Integument: Warm, Dry, No erythema, No rash  Neurologic: Alert & oriented x 3, Normal motor function, Normal sensory function, No focal deficits noted. Normal gait.   Psychiatric: Affect normal, Judgment normal, Mood normal.      LAB:  All pertinent labs reviewed and interpreted.  Results for orders placed or performed during the hospital encounter of 09/14/24   Basic metabolic panel   Result Value Ref Range    Sodium 139 135 - 145 mmol/L    Potassium 2.9 (L) 3.4 - 5.3 mmol/L    Chloride 103 98 - 107 mmol/L    Carbon Dioxide (CO2) 28 22 - 29 mmol/L    Anion Gap 8 7 - 15 mmol/L    Urea Nitrogen <1.4 (L) 6.0 - 20.0 mg/dL    Creatinine 0.59 0.51 - 0.95 mg/dL    GFR Estimate >90 >60 mL/min/1.73m2    Calcium 9.1 8.8 - 10.4 mg/dL    Glucose 96 70 - 99 mg/dL   Result Value Ref Range    CRP Inflammation <3.00 <5.00 mg/L   CBC with platelets and differential   Result Value Ref Range    WBC Count 5.4 4.0 - 11.0 10e3/uL    RBC Count 4.30 3.80 - 5.20 10e6/uL    Hemoglobin 12.6 11.7 - 15.7 g/dL    Hematocrit 35.9 35.0 - 47.0 %    MCV 84 78 - 100 fL    MCH 29.3 26.5 - 33.0 pg    MCHC 35.1 31.5 - 36.5 g/dL    RDW 12.7 10.0 - 15.0 %    Platelet Count 202 150 - 450 10e3/uL    % Neutrophils 61 %    % Lymphocytes 28 %    % Monocytes 9 %    % Eosinophils 1 %    % Basophils 1 %    % Immature Granulocytes 0 %    NRBCs per 100 WBC 0 <1 /100    Absolute Neutrophils 3.3 1.6 - 8.3 10e3/uL    Absolute Lymphocytes 1.5 0.8 - 5.3 10e3/uL    Absolute Monocytes 0.5 0.0 - 1.3 10e3/uL    Absolute Eosinophils 0.1 0.0 - 0.7 10e3/uL    Absolute Basophils 0.1 0.0 - 0.2 10e3/uL    Absolute Immature Granulocytes 0.0 <=0.4 10e3/uL    Absolute NRBCs 0.0 10e3/uL   Result Value  Ref Range    Magnesium 1.8 1.7 - 2.3 mg/dL   ECG 12-LEAD WITH MUSE (LHE)   Result Value Ref Range    Systolic Blood Pressure  mmHg    Diastolic Blood Pressure  mmHg    Ventricular Rate 81 BPM    Atrial Rate 81 BPM    CO Interval 140 ms    QRS Duration 80 ms     ms    QTc 422 ms    P Axis 62 degrees    R AXIS 75 degrees    T Axis 52 degrees    Interpretation ECG       Sinus rhythm  Cannot rule out Anterior infarct , age undetermined  Abnormal ECG  When compared with ECG of 07-Jul-2024 21:06,  No significant change was found  Confirmed by SEE ED PROVIDER NOTE FOR, ECG INTERPRETATION ([id number]),  Shakira Castellano ([id number]) on 9/14/2024 8:36:31 PM         RADIOLOGY:  Reviewed all pertinent imaging. Please see official radiology report.  No orders to display       EKG:    Performed at: 20:27    Impression: sinus rhythm    Rate: 81 bpm  Rhythm: sinus    CO Interval: 140 ms  QRS Interval: 80 ms  QTc Interval: 422 ms  ST Changes: no acute ischemia  Comparison: no change from 7/7/24    I have independently reviewed and interpreted the EKG(s) documented above.      Apolonia Sam MD  Emergency Medicine  [facility] EMERGENCY ROOM  [address] [facility] [id number] [phone]       Apolonia Sam MD  09/14/24 6919

## 2024-09-29 ENCOUNTER — HEALTH MAINTENANCE LETTER (OUTPATIENT)
Age: 34
End: 2024-09-29

## 2024-10-08 ENCOUNTER — OFFICE VISIT (OUTPATIENT)
Dept: GASTROENTEROLOGY | Facility: CLINIC | Age: 34
End: 2024-10-08
Attending: EMERGENCY MEDICINE
Payer: COMMERCIAL

## 2024-10-08 VITALS
DIASTOLIC BLOOD PRESSURE: 78 MMHG | BODY MASS INDEX: 25.09 KG/M2 | SYSTOLIC BLOOD PRESSURE: 120 MMHG | HEART RATE: 70 BPM | OXYGEN SATURATION: 100 % | HEIGHT: 66 IN | WEIGHT: 156.1 LBS

## 2024-10-08 DIAGNOSIS — R13.14 PHARYNGOESOPHAGEAL DYSPHAGIA: ICD-10-CM

## 2024-10-08 DIAGNOSIS — R13.10 ODYNOPHAGIA: Primary | ICD-10-CM

## 2024-10-08 DIAGNOSIS — K21.9 GASTROESOPHAGEAL REFLUX DISEASE WITHOUT ESOPHAGITIS: ICD-10-CM

## 2024-10-08 PROCEDURE — 99204 OFFICE O/P NEW MOD 45 MIN: CPT | Performed by: PHYSICIAN ASSISTANT

## 2024-10-08 RX ORDER — PANTOPRAZOLE SODIUM 40 MG/1
40 TABLET, DELAYED RELEASE ORAL 2 TIMES DAILY
Qty: 60 TABLET | Refills: 2 | Status: SHIPPED | OUTPATIENT
Start: 2024-10-08

## 2024-10-08 ASSESSMENT — PAIN SCALES - GENERAL: PAINLEVEL: NO PAIN (0)

## 2024-10-08 NOTE — LETTER
10/8/2024      Marine Andrews  [address]  [place] [address]      Dear Colleague,    Thank you for referring your patient, Marine Andrews, to the [facility]. Please see a copy of my visit note below.    NEW PATIENT GI CLINIC VISIT    CC/REFERRING MD:  Daiana Davis  REASON FOR CONSULTATION:   Daiana Davis for   Chief Complaint   Patient presents with     New Patient     New consult for esophageal dysphagia, globus sensation.       ASSESSMENT/PLAN: Patient here for evaluation of roughly 3 months of oropharyngeal versus proximal esophageal dysphagia, odynophagia described as right sided neck pain with swallowing, with poor oral intake resulting in unintentional weight loss.  Reviewed her workup thus far.  Reassuring laboratory findings and CT of the neck with no clear vascular or other structural abnormalities.  Her oropharynx appears clear on exam today as well.  She does describe some associated reflux symptoms.  Perhaps she has LPR contributing to symptoms.  Will start Protonix 40 mg twice daily.  I encouraged her to maintain nutrition as much as possible, use protein shakes a couple of times per day.  We will arrange for a video swallow exam to evaluate for oropharyngeal motility and upper endoscopy to assess the dysphagia.  Will follow-up with patient after tests are completed.    1. Pharyngoesophageal dysphagia  - Adult GI  Referral - Consult Only  - Speech Therapy  Referral; Future  - XR Video Swallow with SLP or OT - Order with Speech Therapy Referral; Future  - pantoprazole (PROTONIX) 40 MG EC tablet; Take 1 tablet (40 mg) by mouth 2 times daily.  Dispense: 60 tablet; Refill: 2  - Adult GI  Referral - Procedure Only; Future    2. Odynophagia  - Speech Therapy  Referral; Future  - XR Video Swallow with SLP or OT - Order with Speech Therapy Referral; Future  - pantoprazole (PROTONIX) 40 MG EC tablet; Take 1 tablet (40 mg) by  mouth 2 times daily.  Dispense: 60 tablet; Refill: 2  - Adult GI  Referral - Procedure Only; Future    3. Gastroesophageal reflux disease without esophagitis  - pantoprazole (PROTONIX) 40 MG EC tablet; Take 1 tablet (40 mg) by mouth 2 times daily.  Dispense: 60 tablet; Refill: 2  - Adult GI  Referral - Procedure Only; Future        RTC 2-3 months    Thank you for this consultation.  It was a pleasure to participate in the care of this patient; please contact us with any further questions.      20 minutes spent on the date of the encounter doing chart review, patient visit, and documentation    This note was created with voice recognition software, and while reviewed for accuracy, typos may remain.     Carlos Shaikh PA-C  Division of Gastroenterology, Hepatology and Nutrition  [facility]  Marine FISCHER Darryl is a 34 year old female with no significant past medical history that is seen as a new patient in the GI clinic today for odynophagia described as right sided neck pain with swallowing, globus, oropharyngeal dysphagia, with resultant poor oral intake and unintentional weight loss of 15 lbs in the last 3 months.  This problem originated around 3 months ago, resulting in visit to primary care followed by a visit to the ER.  Per ER notes, it sounds like this was initially felt to be related to anxiety and described as foreign body sensation in the throat.  She had subsequent visits to the ER roughly a month later and again in early in mid September with similar complaints.  CTA head and neck in early September did not reveal any specific abnormalities.  Laboratory testing at the time was notable for normal CBC, BMP, CRP, and hepatic panel.  She has not had any endoscopic evaluation.  She endorses having frequent heartburn and reflux, not necessarily describing any esophageal dysphagia, vomiting, or regurgitation.  Due to the severity of the neck  pain with swallowing, she has had difficulty with eating and has subsequently had some weight loss.  She has been treated with a variety of medications for this, including gabapentin, hydroxyzine, cyclobenzaprine, lidocaine patches, and has also been advised to use OTC omeprazole.  These have not really helped much.    No pertinent surgical history or family medical history of digestive diseases.  No tobacco, alcohol, or drug use.    ROS:    10 point ROS neg other than the symptoms noted above in the HPI.    PREVIOUS ENDOSCOPY:  None    PERTINENT RELEVANT IMAGING OR LABS:  Reviewed, see HPI    ALLERGIES:     Allergies   Allergen Reactions     Ceclor [Cefaclor] Rash       PERTINENT MEDICATIONS:    Current Outpatient Medications:      Lidocaine (LIDOCARE) 4 % Patch, Place 1 patch over 12 hours onto the skin every 24 hours. To prevent lidocaine toxicity, patient should be patch free for 12 hrs daily., Disp: 15 patch, Rfl: 0     pantoprazole (PROTONIX) 40 MG EC tablet, Take 1 tablet (40 mg) by mouth 2 times daily., Disp: 60 tablet, Rfl: 2     cyclobenzaprine (FLEXERIL) 5 MG tablet, Take 1 tablet (5 mg) by mouth 3 times daily as needed for muscle spasms (Patient not taking: Reported on 7/16/2024), Disp: 30 tablet, Rfl: 0     gabapentin (NEURONTIN) 100 MG capsule, Take 1 capsule (100 mg) by mouth 3 times daily for 14 days., Disp: 42 capsule, Rfl: 0     hydrOXYzine HCl (ATARAX) 25 MG tablet, Take 2 tablets (50 mg) by mouth every 8 hours as needed for anxiety (Patient not taking: Reported on 8/28/2024), Disp: 20 tablet, Rfl: 0    PROBLEM LIST  Patient Active Problem List    Diagnosis Date Noted     Encounter for triage in pregnant patient 06/10/2022     Priority: Medium     Acute cystitis without hematuria 06/10/2022     Priority: Medium     Benign neoplasm of skin 05/24/2007     Priority: Medium     05/24/07: left thenar prominance. Referred to surgery for excision.  Problem list name updated by automated process. Provider  "to review       Proteinuria 09/27/2005     Priority: Medium     September 27, 2005: Some dysuria.  UA neg for ID, but Urine protein 100 mg/dl.  Need to repeat UA in 1 week.  Repeat UA negative for protein.       Allergic rhinitis 09/27/2005     Priority: Medium     September 27, 2005: Itchy eyes, tearing eyes, sneezing and eustachian tube dysfunction.  FHx  Problem list name updated by automated process. Provider to review         PERTINENT PAST MEDICAL HISTORY:  Past Medical History:   Diagnosis Date     Anxiety      Depressive disorder        PREVIOUS SURGERIES:  Past Surgical History:   Procedure Laterality Date     TUBAL LIGATION Right        SOCIAL HISTORY:  Social History     Socioeconomic History     Marital status: Single     Spouse name: Not on file     Number of children: Not on file     Years of education: Not on file     Highest education level: Not on file   Occupational History     Not on file   Tobacco Use     Smoking status: Never     Smokeless tobacco: Never     Tobacco comments:     dad smokes   Vaping Use     Vaping status: Never Used   Substance and Sexual Activity     Alcohol use: No     Drug use: No     Sexual activity: Yes     Partners: Male     Comment: states \"using protection\"   Other Topics Concern     Not on file   Social History Narrative     Not on file     Social Determinants of Health     Financial Resource Strain: Low Risk  (7/7/2024)    Received from [facility]    Financial Resource Strain      Difficulty of Paying Living Expenses: 3      Difficulty of Paying Living Expenses: Not on file   Food Insecurity: No Food Insecurity (7/7/2024)    Received from [facility]    Food Insecurity      Worried About Running Out of Food in the Last Year: 1   Transportation Needs: No Transportation Needs (7/7/2024)    Received from [facility]    Transportation Needs      Lack of Transportation " "(Medical): 1   Physical Activity: Not on file   Stress: Not on file   Social Connections: Socially Integrated (7/7/2024)    Received from [employer]    Social Connections      Frequency of Communication with Friends and Family: 0   Interpersonal Safety: Not on file   Housing Stability: Low Risk  (7/7/2024)    Received from [employer]    Housing Stability      Unable to Pay for Housing in the Last Year: 1       FAMILY HISTORY:  Family History   Problem Relation Age of Onset     Family History Negative Mother      Family History Negative Father      Family History Negative Maternal Grandmother      Family History Negative Maternal Grandfather      Cancer Paternal Grandmother         Esophageal cancer     Family History Negative Paternal Grandfather      Ulcers Mother      Ulcers Maternal Grandmother        Past/family/social history reviewed and no changes    PHYSICAL EXAMINATION:  Constitutional: aaox3, cooperative, pleasant, not dyspneic/diaphoretic, no acute distress  Vitals reviewed: /78 (BP Location: Left arm, Patient Position: Sitting, Cuff Size: Adult Regular)   Pulse 70   Ht 1.676 m (5' 6\")   Wt 70.8 kg (156 lb 1.6 oz)   SpO2 100%   BMI 25.20 kg/m    Wt:   Wt Readings from Last 2 Encounters:   10/08/24 70.8 kg (156 lb 1.6 oz)   09/14/24 72.6 kg (160 lb)      Eyes: Sclera anicteric/injected  CV: No edema  Respiratory: Unlabored breathing  Skin: warm, perfused, no jaundice  Psych: Normal affect  MSK: Normal gait                      Again, thank you for allowing me to participate in the care of your patient.        Sincerely,        Carlos Shaikh PA-C  "

## 2024-10-08 NOTE — NURSING NOTE
"Chief Complaint   Patient presents with    New Patient     New consult for esophageal dysphagia, globus sensation.     She requests these members of her care team be copied on today's visit information:  Referring Provider:  Daiana Davis MD  [facility] [place] [address]    Vitals:    10/08/24 1440   BP: 120/78   BP Location: Left arm   Patient Position: Sitting   Cuff Size: Adult Regular   Pulse: 70   SpO2: 100%   Weight: 70.8 kg (156 lb 1.6 oz)   Height: 1.676 m (5' 6\")     Body mass index is 25.2 kg/m .    Medications were reconciled.        Catalina Mckeon CMA    "

## 2024-10-08 NOTE — PROGRESS NOTES
NEW PATIENT GI CLINIC VISIT    CC/REFERRING MD:  Daiana Davis  REASON FOR CONSULTATION:   Daiana Davis for   Chief Complaint   Patient presents with    New Patient     New consult for esophageal dysphagia, globus sensation.       ASSESSMENT/PLAN: Patient here for evaluation of roughly 3 months of oropharyngeal versus proximal esophageal dysphagia, odynophagia described as right sided neck pain with swallowing, with poor oral intake resulting in unintentional weight loss.  Reviewed her workup thus far.  Reassuring laboratory findings and CT of the neck with no clear vascular or other structural abnormalities.  Her oropharynx appears clear on exam today as well.  She does describe some associated reflux symptoms.  Perhaps she has LPR contributing to symptoms.  Will start Protonix 40 mg twice daily.  I encouraged her to maintain nutrition as much as possible, use protein shakes a couple of times per day.  We will arrange for a video swallow exam to evaluate for oropharyngeal motility and upper endoscopy to assess the dysphagia.  Will follow-up with patient after tests are completed.    1. Pharyngoesophageal dysphagia  - Adult GI  Referral - Consult Only  - Speech Therapy  Referral; Future  - XR Video Swallow with SLP or OT - Order with Speech Therapy Referral; Future  - pantoprazole (PROTONIX) 40 MG EC tablet; Take 1 tablet (40 mg) by mouth 2 times daily.  Dispense: 60 tablet; Refill: 2  - Adult GI  Referral - Procedure Only; Future    2. Odynophagia  - Speech Therapy  Referral; Future  - XR Video Swallow with SLP or OT - Order with Speech Therapy Referral; Future  - pantoprazole (PROTONIX) 40 MG EC tablet; Take 1 tablet (40 mg) by mouth 2 times daily.  Dispense: 60 tablet; Refill: 2  - Adult GI  Referral - Procedure Only; Future    3. Gastroesophageal reflux disease without esophagitis  - pantoprazole (PROTONIX) 40 MG EC tablet; Take 1 tablet (40 mg) by  mouth 2 times daily.  Dispense: 60 tablet; Refill: 2  - Adult GI  Referral - Procedure Only; Future        RTC 2-3 months    Thank you for this consultation.  It was a pleasure to participate in the care of this patient; please contact us with any further questions.      20 minutes spent on the date of the encounter doing chart review, patient visit, and documentation    This note was created with voice recognition software, and while reviewed for accuracy, typos may remain.     Carlos Shaikh PA-C  Division of Gastroenterology, Hepatology and Nutrition  [facility]  Marine FISCHER Darryl is a 34 year old female with no significant past medical history that is seen as a new patient in the GI clinic today for odynophagia described as right sided neck pain with swallowing, globus, oropharyngeal dysphagia, with resultant poor oral intake and unintentional weight loss of 15 lbs in the last 3 months.  This problem originated around 3 months ago, resulting in visit to primary care followed by a visit to the ER.  Per ER notes, it sounds like this was initially felt to be related to anxiety and described as foreign body sensation in the throat.  She had subsequent visits to the ER roughly a month later and again in early in mid September with similar complaints.  CTA head and neck in early September did not reveal any specific abnormalities.  Laboratory testing at the time was notable for normal CBC, BMP, CRP, and hepatic panel.  She has not had any endoscopic evaluation.  She endorses having frequent heartburn and reflux, not necessarily describing any esophageal dysphagia, vomiting, or regurgitation.  Due to the severity of the neck pain with swallowing, she has had difficulty with eating and has subsequently had some weight loss.  She has been treated with a variety of medications for this, including gabapentin, hydroxyzine, cyclobenzaprine, lidocaine patches, and  has also been advised to use OTC omeprazole.  These have not really helped much.    No pertinent surgical history or family medical history of digestive diseases.  No tobacco, alcohol, or drug use.    ROS:    10 point ROS neg other than the symptoms noted above in the HPI.    PREVIOUS ENDOSCOPY:  None    PERTINENT RELEVANT IMAGING OR LABS:  Reviewed, see HPI    ALLERGIES:     Allergies   Allergen Reactions    Ceclor [Cefaclor] Rash       PERTINENT MEDICATIONS:    Current Outpatient Medications:     Lidocaine (LIDOCARE) 4 % Patch, Place 1 patch over 12 hours onto the skin every 24 hours. To prevent lidocaine toxicity, patient should be patch free for 12 hrs daily., Disp: 15 patch, Rfl: 0    pantoprazole (PROTONIX) 40 MG EC tablet, Take 1 tablet (40 mg) by mouth 2 times daily., Disp: 60 tablet, Rfl: 2    cyclobenzaprine (FLEXERIL) 5 MG tablet, Take 1 tablet (5 mg) by mouth 3 times daily as needed for muscle spasms (Patient not taking: Reported on 7/16/2024), Disp: 30 tablet, Rfl: 0    gabapentin (NEURONTIN) 100 MG capsule, Take 1 capsule (100 mg) by mouth 3 times daily for 14 days., Disp: 42 capsule, Rfl: 0    hydrOXYzine HCl (ATARAX) 25 MG tablet, Take 2 tablets (50 mg) by mouth every 8 hours as needed for anxiety (Patient not taking: Reported on 8/28/2024), Disp: 20 tablet, Rfl: 0    PROBLEM LIST  Patient Active Problem List    Diagnosis Date Noted    Encounter for triage in pregnant patient 06/10/2022     Priority: Medium    Acute cystitis without hematuria 06/10/2022     Priority: Medium    Benign neoplasm of skin 05/24/2007     Priority: Medium     05/24/07: left thenar prominance. Referred to surgery for excision.  Problem list name updated by automated process. Provider to review      Proteinuria 09/27/2005     Priority: Medium     September 27, 2005: Some dysuria.  UA neg for ID, but Urine protein 100 mg/dl.  Need to repeat UA in 1 week.  Repeat UA negative for protein.      Allergic rhinitis 09/27/2005      "Priority: Medium     September 27, 2005: Itchy eyes, tearing eyes, sneezing and eustachian tube dysfunction.  FHx  Problem list name updated by automated process. Provider to review         PERTINENT PAST MEDICAL HISTORY:  Past Medical History:   Diagnosis Date    Anxiety     Depressive disorder        PREVIOUS SURGERIES:  Past Surgical History:   Procedure Laterality Date    TUBAL LIGATION Right        SOCIAL HISTORY:  Social History     Socioeconomic History    Marital status: Single     Spouse name: Not on file    Number of children: Not on file    Years of education: Not on file    Highest education level: Not on file   Occupational History    Not on file   Tobacco Use    Smoking status: Never    Smokeless tobacco: Never    Tobacco comments:     dad smokes   Vaping Use    Vaping status: Never Used   Substance and Sexual Activity    Alcohol use: No    Drug use: No    Sexual activity: Yes     Partners: Male     Comment: states \"using protection\"   Other Topics Concern    Not on file   Social History Narrative    Not on file     Social Determinants of Health     Financial Resource Strain: Low Risk  (7/7/2024)    [facility]    Financial Resource Strain     Difficulty of Paying Living Expenses: 3     Difficulty of Paying Living Expenses: Not on file   Food Insecurity: No Food Insecurity (7/7/2024)    [facility]    Food Insecurity     Worried About Running Out of Food in the Last Year: 1   Transportation Needs: No Transportation Needs (7/7/2024)    Received from [facility]    Transportation Needs     Lack of Transportation (Medical): 1   Physical Activity: Not on file   Stress: Not on file   Social Connections: Socially Integrated (7/7/2024)    [facility]    Social Connections     Frequency of Communication with Friends and Family: 0 " "  Interpersonal Safety: Not on file   Housing Stability: Low Risk  (7/7/2024)    Received from [facility]    Housing Stability     Unable to Pay for Housing in the Last Year: 1       FAMILY HISTORY:  Family History   Problem Relation Age of Onset    Family History Negative Mother     Family History Negative Father     Family History Negative Maternal Grandmother     Family History Negative Maternal Grandfather     Cancer Paternal Grandmother         Esophageal cancer    Family History Negative Paternal Grandfather     Ulcers Mother     Ulcers Maternal Grandmother        Past/family/social history reviewed and no changes    PHYSICAL EXAMINATION:  Constitutional: aaox3, cooperative, pleasant, not dyspneic/diaphoretic, no acute distress  Vitals reviewed: /78 (BP Location: Left arm, Patient Position: Sitting, Cuff Size: Adult Regular)   Pulse 70   Ht 1.676 m (5' 6\")   Wt 70.8 kg (156 lb 1.6 oz)   SpO2 100%   BMI 25.20 kg/m    Wt:   Wt Readings from Last 2 Encounters:   10/08/24 70.8 kg (156 lb 1.6 oz)   09/14/24 72.6 kg (160 lb)      Eyes: Sclera anicteric/injected  CV: No edema  Respiratory: Unlabored breathing  Skin: warm, perfused, no jaundice  Psych: Normal affect  MSK: Normal gait                    "

## 2024-10-09 ENCOUNTER — TELEPHONE (OUTPATIENT)
Dept: GASTROENTEROLOGY | Facility: CLINIC | Age: 34
End: 2024-10-09
Payer: COMMERCIAL

## 2024-10-09 NOTE — TELEPHONE ENCOUNTER
Pre assessment completed for upcoming procedure.   (Please see previous telephone encounter notes for complete details)    Procedure details:    Arrival time and facility location reviewed.    Pre op exam needed? No.    Designated  policy reviewed. Instructed to have someone stay 6  hours post procedure.       Medication review:    Medications reviewed. Please see supporting documentation below. Holding recommendations discussed (if applicable).       Prep for procedure:     Procedure prep instructions reviewed.        Any additional information needed:  N/A      Patient  verbalized understanding and had no questions or concerns at this time.      Kemi Condon RN  Endoscopy Procedure Pre Assessment   [phone] option 2

## 2024-10-09 NOTE — TELEPHONE ENCOUNTER
Pre visit planning completed.      Procedure details:    Patient scheduled for Upper endoscopy (EGD) on 10/11/2024.     Arrival time: 1015. Procedure time 1100    Facility location: [facility]; [address], [place] [address]. Check in location: 2nd Floor at Surgery desk.    Sedation type: Conscious sedation - Anxiety noted in chart. No ZI score on file- pt given Valium 1 tab to take an hour prior to procedure (see Care Everywhere)    Pre op exam needed? No.    Indication for procedure:   Pharyngoesophageal dysphagia   Odynophagia   Gastroesophageal reflux disease without esophagitis            Chart review:     Electronic implanted devices? No    Recent diagnosis of diverticulitis within the last 6 weeks? No    Medication review:    Diabetic? No    Anticoagulants? No    Weight loss medication/injectable? No GLP-1 medication per patient's medication list.  RN will verify with pre-assessment call.    Other medication HOLDING recommendations:  N/A      Prep for procedure:     Bowel prep recommendation: N/A    Prep instructions sent via johny Condon RN  Endoscopy Procedure Pre Assessment RN  [phone] option 2

## 2024-10-09 NOTE — TELEPHONE ENCOUNTER
"Endoscopy Scheduling Screen    Have you had any respiratory illness or flu-like symptoms in the last 10 days?  No    What is your communication preference for Instructions and/or Bowel Prep?   MyChart    What insurance is in the chart?  Other:  [facility]    Ordering/Referring Provider: WANDA NEUMANN   (If ordering provider performs procedure, schedule with ordering provider unless otherwise instructed. )    BMI: Estimated body mass index is 25.2 kg/m  as calculated from the following:    Height as of 10/8/24: 1.676 m (5' 6\").    Weight as of 10/8/24: 70.8 kg (156 lb 1.6 oz).     Sedation Ordered  moderate sedation.   If patient BMI > 50 do not schedule in [facility].    If patient BMI > 45 do not schedule at [facility].    Are you taking methadone or Suboxone?  NO, No RN review required.    Have you been diagnosed and are being treated for severe PTSD or severe anxiety?  NO, No RN review required.    Are you taking any prescription medications for pain 3 or more times per week?   NO, No RN review required.    Do you have a history of malignant hyperthermia?  No    (Females) Are you currently pregnant?   No     Have you been diagnosed or told you have pulmonary hypertension?   No    Do you have an LVAD?  No    Have you been told you have moderate to severe sleep apnea?  No.    Have you been told you have COPD, asthma, or any other lung disease?  No    Do you have any heart conditions?  No     Have you ever had or are you waiting for an organ transplant?  No. Continue scheduling, no site restrictions.    Have you had a stroke or transient ischemic attack (TIA aka \"mini stroke\" in the last 6 months?   No    Have you been diagnosed with or been told you have cirrhosis of the liver?   No.    Are you currently on dialysis?   No    Do you need assistance transferring?   No    BMI: Estimated body mass index is 25.2 kg/m  as calculated from the following:    Height as of 10/8/24: 1.676 m (5' 6\").    Weight as of 10/8/24: 70.8 kg (156 lb " 1.6 oz).     Is patients BMI > 40 and scheduling location UP?  No    Do you take an injectable or oral medication for weight loss or diabetes (excluding insulin)?  No    Do you take the medication Naltrexone?  No    Do you take blood thinners?  No       Prep   Are you currently on dialysis or do you have chronic kidney disease?  No    Do you have a diagnosis of diabetes?  No    Do you have a diagnosis of cystic fibrosis (CF)?  No    On a regular basis do you go 3 -5 days between bowel movements?      BMI > 40?  No    Preferred Pharmacy:    [facility] [address] [place], [place] - [address]  [address]  [facility] [address]  Phone: [phone] Fax: [phone]    [facility] - [place], [place] - [address] [facility]  [address] [facility] [address]  Phone: [phone] Fax: [phone] Alternate Fax: [phone], [phone]    [id number] - [place] [facility] [address] [facility]  AT [address] [place] [address]  Phone: [phone] Fax: [phone]    [facility] [place], [place] - [address] [facility]  [address] [facility] [address]  Phone: [phone] Fax: [phone]    [facility][id number] - [place], [place] - [address] [facility] [address] [facility]  [address] [facility] [address]  Phone: [phone] Fax: [phone]    [facility] #[id number] - [place], [place] - [address] AT [facility] [address]  [facility] [place] [address]  Phone: [phone] Fax: [phone]    [facility] #[id number] - [facility] [address] [facility]  AT [address] [place] [address]  Phone: [phone] Fax: [phone]    [facility] #[id number] - [place], [place] - [address] AT [facility] OF [facility] TO [facility]  [address] [address]  Phone: [phone] Fax: [phone]      Final Scheduling  Details     Procedure scheduled  Upper endoscopy (EGD)    Surgeon:  JUANITA     Date of procedure:  10/11/2024     Pre-OP / PAC:   No - Not required for this site.    Location  [facility] - [facility] - Patient preference.    Sedation   Moderate Sedation - Per order.      Patient Reminders:   You will receive a call from a Nurse to review instructions and health history.  This assessment must be completed prior to your procedure.  Failure to complete the Nurse assessment may result in the procedure being cancelled.      On the day of your procedure, please designate an adult(s) who can drive you home stay with you for the next 24 hours. The medicines used in the exam will make you sleepy. You will not be able to drive.      You cannot take public transportation, ride share services, or non-medical taxi service without a responsible caregiver.  Medical transport services are allowed with the requirement that a responsible caregiver will receive you at your destination.  We require that drivers and caregivers are confirmed prior to your procedure.

## 2024-10-11 ENCOUNTER — HOSPITAL ENCOUNTER (OUTPATIENT)
Facility: AMBULATORY SURGERY CENTER | Age: 34
Discharge: HOME | End: 2024-10-11
Attending: INTERNAL MEDICINE | Admitting: INTERNAL MEDICINE
Payer: COMMERCIAL

## 2024-10-11 VITALS
RESPIRATION RATE: 18 BRPM | TEMPERATURE: 97.6 F | OXYGEN SATURATION: 100 % | SYSTOLIC BLOOD PRESSURE: 114 MMHG | HEART RATE: 68 BPM | DIASTOLIC BLOOD PRESSURE: 85 MMHG

## 2024-10-11 LAB — UPPER GI ENDOSCOPY: NORMAL

## 2024-10-11 PROCEDURE — 43235 EGD DIAGNOSTIC BRUSH WASH: CPT

## 2024-10-11 PROCEDURE — G8918 PT W/O PREOP ORDER IV AB PRO: HCPCS

## 2024-10-11 PROCEDURE — G8907 PT DOC NO EVENTS ON DISCHARG: HCPCS

## 2024-10-11 RX ORDER — DIPHENHYDRAMINE HYDROCHLORIDE 50 MG/ML
INJECTION INTRAMUSCULAR; INTRAVENOUS PRN
Status: DISCONTINUED | OUTPATIENT
Start: 2024-10-11 | End: 2024-10-11 | Stop reason: HOSPADM

## 2024-10-11 RX ORDER — FENTANYL CITRATE 50 UG/ML
INJECTION, SOLUTION INTRAMUSCULAR; INTRAVENOUS PRN
Status: DISCONTINUED | OUTPATIENT
Start: 2024-10-11 | End: 2024-10-11 | Stop reason: HOSPADM

## 2024-10-15 NOTE — RESULT ENCOUNTER NOTE
Miquel Hawthorne,    The report from your recent upper endoscopy is available for you to review.  Overall, everything looks normal, which is reassuring.  Please keep your appointment for the video swallow and esophagram later this month.    Sincerely,  Carlos BENITEZ [facility] GI, Hepatology, and Nutrition

## 2024-10-29 ENCOUNTER — HOSPITAL ENCOUNTER (OUTPATIENT)
Dept: RADIOLOGY | Facility: CLINIC | Age: 34
Discharge: HOME | End: 2024-10-29
Attending: PHYSICIAN ASSISTANT
Payer: COMMERCIAL

## 2024-10-29 ENCOUNTER — HOSPITAL ENCOUNTER (OUTPATIENT)
Dept: SPEECH THERAPY | Facility: CLINIC | Age: 34
Discharge: HOME | End: 2024-10-29
Attending: PHYSICIAN ASSISTANT
Payer: COMMERCIAL

## 2024-10-29 DIAGNOSIS — R13.10 ODYNOPHAGIA: ICD-10-CM

## 2024-10-29 DIAGNOSIS — R13.14 PHARYNGOESOPHAGEAL DYSPHAGIA: ICD-10-CM

## 2024-10-29 PROCEDURE — 74230 X-RAY XM SWLNG FUNCJ C+: CPT

## 2024-10-29 PROCEDURE — 92611 MOTION FLUOROSCOPY/SWALLOW: CPT | Mod: GN

## 2024-10-29 PROCEDURE — 92610 EVALUATE SWALLOWING FUNCTION: CPT | Mod: GN

## 2024-10-29 NOTE — RESULT ENCOUNTER NOTE
Miquel Hawthorne,    The report from your recent video swallow exam is available for you to review.  Overall, this looked normal.  I am still awaiting the full speech-language pathology report, but it looks like the images did not show any evidence of dysmotility or obstruction.    How have you been feeling since your last visit with me?  Has the pantoprazole been helpful?    Sincerely,  Carlos BENITEZ [facility] GI, Hepatology, and Nutrition

## 2024-10-30 NOTE — PROGRESS NOTES
"SPEECH LANGUAGE PATHOLOGY EVALUATION    See electronic medical record for Abuse and Falls Screening details.    Subjective      Presenting condition or subjective complaint: Difficulty swallowing  Date of onset: 10/08/24 (Order date)    Relevant medical history: Depression; recent right shoulder/neck injury   Dates & types of surgery: N/A    Prior diagnostic imaging/testing results: MRI; CT scan; X-ray. Refer to Radiology reports for results. Patient also had an EGD on 10/11/24. This was unremarkable.     Prior therapy history for the same diagnosis, illness or injury: No      General observations: Patient was pleasant and cooperative. She arrived to this appointment unaccompanied.     Pain assessment: Pain denied     Objective     SWALLOW EVALUTION  Dysphagia history: Per referring provider's clinic visit note dated 10/8/24, \"Patient here for evaluation of roughly 3 months of oropharyngeal versus proximal esophageal dysphagia, odynophagia described as right sided neck pain with swallowing, with poor oral intake resulting in unintentional weight loss.  Reviewed her workup thus far.  Reassuring laboratory findings and CT of the neck with no clear vascular or other structural abnormalities.  Her oropharynx appears clear on exam today as well.  She does describe some associated reflux symptoms.  Perhaps she has LPR contributing to symptoms.  Will start Protonix 40 mg twice daily.  I encouraged her to maintain nutrition as much as possible, use protein shakes a couple of times per day.  We will arrange for a video swallow exam to evaluate for oropharyngeal motility and upper endoscopy to assess the dysphagia.  Will follow-up with patient after tests are completed. \" During today's study, patient echoes much of this information. She reports that her swallowing difficulty began shortly after she sustained a neck and shoulder injury at work in May.  She states, \"It's really hard to swallow. It feels like food just sits " "right here [indicating underside of right mandible & upper right throat].\" Patient finds that noodles and rice are particularly problematic. She denies any difficulty swallowing pills/tablets.     Current Diet/Method of Nutritional Intake:  Regular food textures and thin liquids      CLINICAL SWALLOW EVALUATION  Clinical swallow evaluation completed prior to videofluoroscopic swallow study (VFSS) via review of previous records, clinical interview, and oral motor examination.    Oral Motor Function: generally intact  Dentition: natural dentition  Secretion management: WFL  Mucosal quality: good  Mandibular function: intact  Oral labial function: WFL  Lingual function: WFL  Buccal function: intact  Laryngeal function: voicing WFL     ADDITIONAL EVAL COMPLETED TODAY : yes; rationale: VFSS completed per referring provider order.    VIDEOFLUOROSCOPIC SWALLOW STUDY  Radiologist: Brenda Saldaña MD  Views Taken: Left lateral; A/P  Physical location of procedure: [facility]  Patient was sitting upright in swallow study chair.    VFSS textures trialed:   VFSS Eval: Thin Liquids  Mode of Presentation: cup, straw, self-fed   Order of Presentation: Trials 1, 2, 10  Preparatory Phase: WFL  Oral Phase: WFL  Bolus Location When Swallow Initiated: posterior angle of ramus  Pharyngeal Phase: WFL  Rosenbeck's Penetration Aspiration Scale: 1 - no aspiration, contrast does not enter airway  Response to Aspiration:  N/A  Strategies and Compensations:  N/A  Diagnostic Statement: No aspiration or laryngeal penetration. No oropharyngeal residue noted after swallows.    VFSS Eval: Purees  Mode of Presentation: spoon, self-fed   Order of Presentation: Trials 3, 4, 9.  Trial 9 was completed in A/P view  Preparatory Phase: WFL  Oral Phase: WFL  Bolus Location When Swallow Initiated: posterior angle of ramus  Pharyngeal Phase: WFL  Rosenbeck s Penetration Aspiration Scale: 1 - no aspiration, contrast does " not enter airway  Response to Aspiration:  N/A  Strategies and Compensations:  N/A  Diagnostic Statement: No aspiration or laryngeal penetration. No oropharyngeal residue noted after swallows.    VFSS Eval: Solids  Mode of Presentation: self-fed   Order of Presentation: Trials 5, 6, 7  Preparatory Phase: prolonged bolus preparation  Oral Phase: WFL  Bolus Location When Swallow Initiated: posterior angle of ramus  Pharyngeal Phase: WFL   Rosenbeck s Penetration Aspiration Scale: 1 - no aspiration, contrast does not enter airway  Response to Aspiration:  N/A  Strategies and Compensations:  N/A  Diagnostic Statement: Patient demonstrated      ESOPHAGEAL PHASE OF SWALLOW  Patient reports symptoms of esophageal dysphagia. While recent EGD was normal, patient may benefit from an esophagram to evaluate esophageal motility and assess for possible GERD.    SWALLOW ASSESSMENT CLINICAL IMPRESSIONS AND RATIONALE  Diet Consistency Recommendations: Regular food textures and thin liquids  Recommended Safe or Compensatory Swallowing Strategies: small bolus size, slow rate of intake, alternate food and liquid intake, maintain upright sitting position for eating   Medication Administration Recommendations: Per patient preference  Instrumental Assessment Recommendations: As mentioned above, patient may benefit from an esophagram to evaluate esophageal function.    Assessment & Plan   CLINICAL IMPRESSIONS   Medical Diagnosis: Pharyngoesophageal dysphagia; odynophagia    Treatment Diagnosis: N/A   Impression/Assessment: Videofloroscopic swallow study completed per provider order. Aspiration did not occur during this evaluation. Patient's oropharyngeal swallow function is WNL. Oral phase was characterized by good bolus control with all consistencies trialed. Bolus prep and A-P bolus transit were effective and timely. With initial presentation of puree consistency, patient was noted to take only a small amount of barium from the spoon.  "When instructed to take the full teaspoon with next presentation, she demonstrated piecemeal deglutition, swallowing approximately half of the bolus first, followed by the remainder of the bolus. This appeared related to anxiety/apprehension about bolus \"getting stuck\" in her throat. With trials of a solid texture, patient demonstrated prolonged mastication and delayed A-P bolus transit. Again, patient appeared a bit hesitant to swallow a solid texture. Tongue base retraction was WNL. Pharyngeal phase was characterized by a timely swallow response with all consistencies. Epiglottic inversion was timely and complete. Hyolaryngeal excursion and hyolaryngeal elevation were perhaps mildly reduced but WFL (within functional limits). Pharyngeal constriction was WNL. Barium contrast readily passed through the PES and cervical esophagus.      Patient's aspiration risk is low. She is appropriate to continue her current diet of regular food textures and thin liquids with use of the standard safe swallowing precautions listed above.    PLAN OF CARE  Recommended Referrals to Other Professionals:  Patient may benefit from further GI follow up.  If GI testing is negative, consider referral to Neurology. In the minutes following today's study, Radiologist mentioned reflex sympathetic dystrophy or occipital neuralgia as possible causes of patient's swallowing difficulty. Notably, patient mentioned pain at the right base of her head in addition to her right neck and shoulder pain.    Education Assessment:   Learner/Method: Patient;Pictures/Video;Listening;Reading    Risks and benefits of evaluation/treatment have been explained.   Patient/Family/caregiver agrees with Plan of Care.     Evaluation Time:    SLP Eval: oral/pharyngeal swallow function, clinical minutes (35005): 10  SLP Eval: VideoFluoroscopic Swallow function Minutes (40202): 20      Signing Clinician: TESFAYE Gallardo   "

## 2024-10-31 ENCOUNTER — MYC MEDICAL ADVICE (OUTPATIENT)
Dept: GASTROENTEROLOGY | Facility: CLINIC | Age: 34
End: 2024-10-31
Payer: COMMERCIAL

## 2024-10-31 DIAGNOSIS — R13.14 PHARYNGOESOPHAGEAL DYSPHAGIA: Primary | ICD-10-CM

## 2024-11-12 ENCOUNTER — OFFICE VISIT (OUTPATIENT)
Dept: NEUROLOGY | Facility: CLINIC | Age: 34
End: 2024-11-12
Attending: PHYSICIAN ASSISTANT
Payer: COMMERCIAL

## 2024-11-12 VITALS — SYSTOLIC BLOOD PRESSURE: 120 MMHG | DIASTOLIC BLOOD PRESSURE: 77 MMHG | HEART RATE: 65 BPM

## 2024-11-12 DIAGNOSIS — G52.1 GLOSSOPHARYNGEAL NEURALGIA: Primary | ICD-10-CM

## 2024-11-12 DIAGNOSIS — M54.81 OCCIPITAL NEURALGIA OF RIGHT SIDE: ICD-10-CM

## 2024-11-12 PROCEDURE — 99204 OFFICE O/P NEW MOD 45 MIN: CPT | Performed by: PSYCHIATRY & NEUROLOGY

## 2024-11-12 RX ORDER — LORAZEPAM 1 MG/1
TABLET ORAL
Qty: 2 TABLET | Refills: 0 | Status: SHIPPED | OUTPATIENT
Start: 2024-11-12

## 2024-11-12 RX ORDER — GABAPENTIN 300 MG/1
300 CAPSULE ORAL 3 TIMES DAILY
Qty: 60 CAPSULE | Refills: 5 | Status: SHIPPED | OUTPATIENT
Start: 2024-11-12

## 2024-11-12 NOTE — LETTER
2024      Marine Andrews  [address]  [place] [address]      Dear Colleague,    Thank you for referring your patient, Marine Andrews, to the [facility]. Please see a copy of my visit note below.      Neurology Consultation    Patient Name:  Marine Andrews  MRN:  [id number]    :  1990  Date of Service:  2024  Primary care provider:  Juhi, Kya Garay        Chief Complaint: Dysphagia     History of Present Illness:     Marine Andrews is a 34 year old female who presents for evaluation of odynophagia.     She reports that she had an injury at work, lifting boxes, in May. Had right sided neck pain and right shoulder pain.  This has largely resolved with PT.  Started having swallowing problems in 2024.  Feels like something gets stuck in the right side of her throat and this triggers the pain. She gets a sharp pain and burning sensation mostly when she eats.  Does not occur with talking or drinking liquids.  She feels like it is specific to swallowing solids.  Denies pain with chewing.  Feels like there is something stuck in the right side of her throat.  Has this pain in the right side of the neck (posterior part) that radiates up the back of the head in the occipital area.  The pain is also located in her right ear and into the right side of her throat.  Can occur outside . Feels like her tongue on the right side feels heavy.  She feels like she is going to swallow her tongue when she lays down.  She gets dizzy and lightheaded as well with these episodes. Does endorse tenderness to touch in these areas. Talking and touching the back of her tongue does not cause pain.  Pain can last for several hours to days.  She has lost she thinks at least 20 lbs. Get's headaches a lot on the right side of her head, mostly in the right occipital and temple region.  She does endorse photophobia and phonophobia.  Has daily headaches.  Has a  history of migraines.  These headache are different than her migraines.  Denies weakness.  Feels unsteady on her feet.        Has tried ibuprofen. This does not help.      Review of Records  - Video swallow was normal.  - Saw GI reviewed notes   - CT head, CTA H and N unremarkable    Past Medical History:  - Anxiety, depression     Social History:  - Works at a gas station.  Denies tobacco use, recreational drug use, or EtOH use.  She has one 2 year old.     Allergies:  Allergies   Allergen Reactions     Ceclor [Cefaclor] Rash       Physical Exam:  /77 (BP Location: Right arm, Patient Position: Sitting, Cuff Size: Adult Regular)   Pulse 65     General:  No acute distress  Head and Neck: Tender to palpation in the right side of neck, occipital area with equivocal Tinels at the occipital notch  Cardiovascular: Normal rate.  Lung: Respirations are non-labored.  Extremities: Normal range of motion  Integumentary: Warm and dry    Neurologic:  Mental status : alert, fund of knowledge appropriate for visit    LANGUAGE: Speech fluent, no dysarthria     CN:  II- pupils equal and reactive, visual fields full  III, IV, VI- extraocular movements intact  V- sensation intact bilaterally  VII- face symmetric  VIII- hearing intact, no nystagmus  IX, X- palate elevates symmetrically  XI- sternocleidomastoid 5/5  XII- tongue midline    MOTOR : intact bulk   Orbicularis oculi 5/5  Orbicularis oris 5/5  Tongue 5/5  5/5 strength in all ext     SENSORY : intact to temp and vib in BUE and BLE     REFLEXES:       Right Left   Triceps 2+ 2+   Biceps 2+ 2+   Brachioradialis 2+ 2+   Knee jerk 2+ 2+   Ankle jerk 2+ 2+   Boswell absent   Toes down going     CEREBELLUM: finger to nose, finger taps, and heel to shin intact bilaterally     GAIT : normal ; able to do tandem gait     Cognition and Speech: Speech clear and coherent.    Psychiatric: Cooperative, Appropriate mood & affect.     Assessment/Plan:   Marine Andrews is a 34 year old  female who presents for evaluation of odynophagia. Patient started having in 7/2024 sensation of something getting stuck in the right side of her throat that triggers burning pain into the right side of her throat, posterior part of right side of neck that radiates up the occipital part of her head. Had GI evaluation including video swallow study and EGD that was unremarkable.  Her pain certainly sounds neuropathic.  Glossopharyngeal neuralgia and occipital neuralgia are certainly considerations although not classic for either with her pain being outside of both of these nerve distribution. It would also be unusual to have two cranial neuralgias. Swallowing would be an unusual trigger for pain for occipital neuralgia.  Regardless I think we need to go ahead and try treating the pain as patient is having weight loss.  Will start Gabapentin to see if this helps.  Consider carbamazepine if no improvement with Gabapentin. Will also refer her for an occipital nerve block.  Will evaluate for structural etiology with compression of the nerve with MRI brain and MRI soft tissues of the neck.      Plan  > MRI brain W/WO  > MRI neck soft tissues W/WO  > Start Gabapentin 300 mg BID   > Refer to PM&R for right side occipital nerve block   > Follow-up in 2 months     I spent 55 minutes providing care for this patient, including reviewing imaging, labs, and notes as well as providing counseling and coordination of care for the above issues.      Again, thank you for allowing me to participate in the care of your patient.        Sincerely,        Cleopatra Murphy, DO

## 2024-11-12 NOTE — PROGRESS NOTES
Neurology Consultation    Patient Name:  Marine Andrews  MRN:  [id number]    :  1990  Date of Service:  2024  Primary care provider:  Juhi, Kya Garay        Chief Complaint: Dysphagia     History of Present Illness:     Marine Andrews is a 34 year old female who presents for evaluation of odynophagia.     She reports that she had an injury at work, lifting boxes, in May. Had right sided neck pain and right shoulder pain.  This has largely resolved with PT.  Started having swallowing problems in 2024.  Feels like something gets stuck in the right side of her throat and this triggers the pain. She gets a sharp pain and burning sensation mostly when she eats.  Does not occur with talking or drinking liquids.  She feels like it is specific to swallowing solids.  Denies pain with chewing.  Feels like there is something stuck in the right side of her throat.  Has this pain in the right side of the neck (posterior part) that radiates up the back of the head in the occipital area.  The pain is also located in her right ear and into the right side of her throat.  Can occur outside . Feels like her tongue on the right side feels heavy.  She feels like she is going to swallow her tongue when she lays down.  She gets dizzy and lightheaded as well with these episodes. Does endorse tenderness to touch in these areas. Talking and touching the back of her tongue does not cause pain.  Pain can last for several hours to days.  She has lost she thinks at least 20 lbs. Get's headaches a lot on the right side of her head, mostly in the right occipital and temple region.  She does endorse photophobia and phonophobia.  Has daily headaches.  Has a history of migraines.  These headache are different than her migraines.  Denies weakness.  Feels unsteady on her feet.        Has tried ibuprofen. This does not help.      Review of Records  - Video swallow was normal.  - Saw GI reviewed notes   - CT head, CTA  H and N unremarkable    Past Medical History:  - Anxiety, depression     Social History:  - Works at a gas station.  Denies tobacco use, recreational drug use, or EtOH use.  She has one 2 year old.     Allergies:  Allergies   Allergen Reactions    Ceclor [Cefaclor] Rash       Physical Exam:  /77 (BP Location: Right arm, Patient Position: Sitting, Cuff Size: Adult Regular)   Pulse 65     General:  No acute distress  Head and Neck: Tender to palpation in the right side of neck, occipital area with equivocal Tinels at the occipital notch  Cardiovascular: Normal rate.  Lung: Respirations are non-labored.  Extremities: Normal range of motion  Integumentary: Warm and dry    Neurologic:  Mental status : alert, fund of knowledge appropriate for visit    LANGUAGE: Speech fluent, no dysarthria     CN:  II- pupils equal and reactive, visual fields full  III, IV, VI- extraocular movements intact  V- sensation intact bilaterally  VII- face symmetric  VIII- hearing intact, no nystagmus  IX, X- palate elevates symmetrically  XI- sternocleidomastoid 5/5  XII- tongue midline    MOTOR : intact bulk   Orbicularis oculi 5/5  Orbicularis oris 5/5  Tongue 5/5  5/5 strength in all ext     SENSORY : intact to temp and vib in BUE and BLE     REFLEXES:       Right Left   Triceps 2+ 2+   Biceps 2+ 2+   Brachioradialis 2+ 2+   Knee jerk 2+ 2+   Ankle jerk 2+ 2+   Boswell absent   Toes down going     CEREBELLUM: finger to nose, finger taps, and heel to shin intact bilaterally     GAIT : normal ; able to do tandem gait     Cognition and Speech: Speech clear and coherent.    Psychiatric: Cooperative, Appropriate mood & affect.     Assessment/Plan:   Marine Andrews is a 34 year old female who presents for evaluation of odynophagia. Patient started having in 7/2024 sensation of something getting stuck in the right side of her throat that triggers burning pain into the right side of her throat, posterior part of right side of neck that  radiates up the occipital part of her head. Had GI evaluation including video swallow study and EGD that was unremarkable.  Her pain certainly sounds neuropathic.  Glossopharyngeal neuralgia and occipital neuralgia are certainly considerations although not classic for either with her pain being outside of both of these nerve distribution. It would also be unusual to have two cranial neuralgias. Swallowing would be an unusual trigger for pain for occipital neuralgia.  Regardless I think we need to go ahead and try treating the pain as patient is having weight loss.  Will start Gabapentin to see if this helps.  Consider carbamazepine if no improvement with Gabapentin. Will also refer her for an occipital nerve block.  Will evaluate for structural etiology with compression of the nerve with MRI brain and MRI soft tissues of the neck.      Plan  > MRI brain W/WO  > MRI neck soft tissues W/WO  > Start Gabapentin 300 mg BID   > Refer to PM&R for right side occipital nerve block   > Follow-up in 2 months     I spent 55 minutes providing care for this patient, including reviewing imaging, labs, and notes as well as providing counseling and coordination of care for the above issues.

## 2024-11-30 ENCOUNTER — HOSPITAL ENCOUNTER (OUTPATIENT)
Dept: MRI IMAGING | Facility: CLINIC | Age: 34
Discharge: HOME | End: 2024-11-30
Attending: PSYCHIATRY & NEUROLOGY | Admitting: PSYCHIATRY & NEUROLOGY
Payer: COMMERCIAL

## 2024-11-30 DIAGNOSIS — G52.1 GLOSSOPHARYNGEAL NEURALGIA: ICD-10-CM

## 2024-11-30 DIAGNOSIS — M54.81 OCCIPITAL NEURALGIA OF RIGHT SIDE: ICD-10-CM

## 2024-11-30 PROCEDURE — 255N000002 HC RX 255 OP 636: Performed by: PSYCHIATRY & NEUROLOGY

## 2024-11-30 PROCEDURE — A9585 GADOBUTROL INJECTION: HCPCS | Performed by: PSYCHIATRY & NEUROLOGY

## 2024-11-30 PROCEDURE — 70553 MRI BRAIN STEM W/O & W/DYE: CPT

## 2024-11-30 RX ORDER — GADOBUTROL 604.72 MG/ML
7 INJECTION INTRAVENOUS ONCE
Status: COMPLETED | OUTPATIENT
Start: 2024-11-30 | End: 2024-11-30

## 2024-11-30 RX ADMIN — GADOBUTROL 7 ML: 604.72 INJECTION INTRAVENOUS at 16:18

## 2025-01-09 ENCOUNTER — HOSPITAL ENCOUNTER (EMERGENCY)
Facility: CLINIC | Age: 35
Discharge: HOME | End: 2025-01-09
Attending: EMERGENCY MEDICINE

## 2025-01-09 ENCOUNTER — APPOINTMENT (OUTPATIENT)
Dept: RADIOLOGY | Facility: CLINIC | Age: 35
End: 2025-01-09
Attending: EMERGENCY MEDICINE

## 2025-01-09 VITALS
WEIGHT: 149 LBS | RESPIRATION RATE: 20 BRPM | OXYGEN SATURATION: 99 % | DIASTOLIC BLOOD PRESSURE: 95 MMHG | HEIGHT: 66 IN | SYSTOLIC BLOOD PRESSURE: 152 MMHG | TEMPERATURE: 97.8 F | BODY MASS INDEX: 23.95 KG/M2 | HEART RATE: 95 BPM

## 2025-01-09 DIAGNOSIS — U07.1 COVID-19: ICD-10-CM

## 2025-01-09 LAB
FLUAV RNA SPEC QL NAA+PROBE: NEGATIVE
FLUBV RNA RESP QL NAA+PROBE: NEGATIVE
RSV RNA SPEC NAA+PROBE: NEGATIVE
SARS-COV-2 RNA RESP QL NAA+PROBE: POSITIVE

## 2025-01-09 PROCEDURE — 99284 EMERGENCY DEPT VISIT MOD MDM: CPT | Mod: 25

## 2025-01-09 PROCEDURE — 71046 X-RAY EXAM CHEST 2 VIEWS: CPT

## 2025-01-09 PROCEDURE — 87637 SARSCOV2&INF A&B&RSV AMP PRB: CPT | Performed by: EMERGENCY MEDICINE

## 2025-01-09 ASSESSMENT — COLUMBIA-SUICIDE SEVERITY RATING SCALE - C-SSRS
1. IN THE PAST MONTH, HAVE YOU WISHED YOU WERE DEAD OR WISHED YOU COULD GO TO SLEEP AND NOT WAKE UP?: NO
2. HAVE YOU ACTUALLY HAD ANY THOUGHTS OF KILLING YOURSELF IN THE PAST MONTH?: NO
6. HAVE YOU EVER DONE ANYTHING, STARTED TO DO ANYTHING, OR PREPARED TO DO ANYTHING TO END YOUR LIFE?: NO

## 2025-01-09 NOTE — ED TRIAGE NOTES
Patient presents to the ED complaining of a cough for the past 3-5 days that is making her have right sided anterior chest pain when she is coughing and shortness of breath while coughing.  She states the right sided chest pain could be from a known shoulder injury.  No medication prior to arrival.  No shortness of breath noted upon triage.  Patient breathing at a regular rate and in complete sentences without difficulty.     Triage Assessment (Adult)       Row Name 01/09/25 0210          Triage Assessment    Airway WDL WDL        Respiratory WDL    Respiratory WDL X;cough;rhythm/pattern     Rhythm/Pattern, Respiratory shortness of breath;dyspnea upon exertion     Cough Frequency infrequent     Cough Type nonproductive;congested        Skin Circulation/Temperature WDL    Skin Circulation/Temperature WDL WDL        Cardiac WDL    Cardiac WDL X;chest pain        Chest Pain Assessment    Chest Pain Location anterior chest, right        Peripheral/Neurovascular WDL    Peripheral Neurovascular WDL WDL        Cognitive/Neuro/Behavioral WDL    Cognitive/Neuro/Behavioral WDL WDL

## 2025-01-09 NOTE — Clinical Note
Marine Andrews was seen and treated in our emergency department on 1/9/2025.  She may return to work on 01/13/2025.       If you have any questions or concerns, please don't hesitate to call.      Oscar Hall MD

## 2025-01-09 NOTE — ED PROVIDER NOTES
EMERGENCY DEPARTMENT ENCOUNTER      NAME: Marine Andrews  AGE: 34 year old female  YOB: 1990  MRN: [id number]  EVALUATION DATE & TIME: No admission date for patient encounter.    PCP: Kya Reynaga    ED PROVIDER: Oscar Hall M.D.      Chief Complaint   Patient presents with    Shortness of Breath         FINAL IMPRESSION:  No diagnosis found.      ED COURSE & MEDICAL DECISION MAKING:    Pertinent Labs & Imaging studies reviewed. (See chart for details)  34 year old female presents to the Emergency Department for evaluation of cough and shortness of breath.  Has been having febrile myalgias.  Is positive for COVID.  Chest x-ray clear.  Discussed abortive care.  Oxygen saturation normal.  Will discharge home.    2:35 AM I met with the patient to gather history and to perform my initial exam. I discussed the plan for care while in the Emergency Department.        At the conclusion of the encounter I discussed the results of all of the tests and the disposition. The questions were answered. The patient or family acknowledged understanding and was agreeable with the care plan.     Medical Decision Making  Obtained supplemental history:Supplemental history obtained?: No  Reviewed external records: External records reviewed?: No  Care impacted by chronic illness:Documented in Chart  Did you consider but not order tests?: Work up considered but not performed and documented in chart, if applicable  Did you interpret images independently?: Independent interpretation of ECG and images noted in documentation, when applicable.  Consultation discussion with other provider:Did you involve another provider (consultant, , pharmacy, etc.)?: No  Discharge. No recommendations on prescription strength medication(s). See documentation for any additional details.    MIPS:            MEDICATIONS GIVEN IN THE EMERGENCY:  Medications - No data to display    NEW PRESCRIPTIONS STARTED AT TODAY'S ER VISIT  New  Prescriptions    No medications on file          =================================================================    HPI    Patient information was obtained from: patient     U   Marine Andrews is a 34 year old female with no pertinent history who presents to this ED for evaluation of cough shortness of breath.  Start about 3 to 5 days of having cough and shortness of breath.  Having some right-sided chest pain but only with coughing.  Some shortness of breath as well.  No history of lung disease.  Has been febrile at home.  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  No nausea or vomiting.        PAST MEDICAL HISTORY:  Past Medical History:   Diagnosis Date    Anxiety     Depressive disorder        PAST SURGICAL HISTORY:  Past Surgical History:   Procedure Laterality Date    COMBINED ESOPHAGOSCOPY, GASTROSCOPY, DUODENOSCOPY (EGD) WITH CO2 INSUFFLATION N/A 10/11/2024    Procedure: Combined Esophagoscopy, Gastroscopy, Duodenoscopy (Egd) With Co2 Insufflation;  Surgeon: Keira Sullivan DO;  Location: [facility] OR    TUBAL LIGATION Right            CURRENT MEDICATIONS:    No current facility-administered medications for this encounter.     Current Outpatient Medications   Medication Sig Dispense Refill    gabapentin (NEURONTIN) 300 MG capsule Take 1 capsule (300 mg) by mouth 3 times daily. 60 capsule 5    LORazepam (ATIVAN) 1 MG tablet Take 20 minutes prior to MRI for anxiety/claustrophobia, can repeat dose if needed 2 tablet 0    pantoprazole (PROTONIX) 40 MG EC tablet Take 1 tablet (40 mg) by mouth 2 times daily. 60 tablet 2         ALLERGIES:  Allergies   Allergen Reactions    Lactose Intolerance (Gi)     Ceclor [Cefaclor] Rash       FAMILY HISTORY:  Family History   Problem Relation Age of Onset    Family History Negative Mother     Family History Negative Father     Family History Negative Maternal Grandmother     Family History Negative Maternal Grandfather     Cancer Paternal Grandmother      "    Esophageal cancer    Family History Negative Paternal Grandfather     Ulcers Mother     Ulcers Maternal Grandmother        SOCIAL HISTORY:   Social History     Socioeconomic History    Marital status: Single   Tobacco Use    Smoking status: Never    Smokeless tobacco: Never    Tobacco comments:     dad smokes   Vaping Use    Vaping status: Never Used   Substance and Sexual Activity    Alcohol use: No    Drug use: No    Sexual activity: Yes     Partners: Male     Comment: states \"using protection\"     Social Drivers of Health     Financial Resource Strain: Low Risk  (7/7/2024)    Received from [facility]    Financial Resource Strain     Difficulty of Paying Living Expenses: 3   Food Insecurity: No Food Insecurity (7/7/2024)    Received from [facility]    Food Insecurity     Do you worry your food will run out before you are able to buy more?: 1   Transportation Needs: No Transportation Needs (7/7/2024)    Received from [facility]    Transportation Needs     Does lack of transportation keep you from medical appointments?: 1     Does lack of transportation keep you from work, meetings or getting things that you need?: 1   Social Connections: Socially Integrated (7/7/2024)    Received from [facility]    Social Connections     Do you often feel lonely or isolated from those around you?: 0   Housing Stability: Low Risk  (7/7/2024)    Received from [facility]    Housing Stability     What is your housing situation today?: 1       VITALS:  BP (!) 152/81   Pulse 88   Temp 97.8  F (36.6  C) (Temporal)   Resp 20   Ht 1.676 m (5' 6\")   Wt 67.6 kg (149 lb)   SpO2 99%   BMI 24.05 kg/m      PHYSICAL EXAM    Physical Exam  Vitals and nursing note reviewed.   Constitutional:       General: She is not in acute distress.     Appearance: She is not " diaphoretic.   HENT:      Head: Atraumatic.      Mouth/Throat:      Pharynx: No oropharyngeal exudate.   Eyes:      General: No scleral icterus.     Pupils: Pupils are equal, round, and reactive to light.   Cardiovascular:      Rate and Rhythm: Normal rate and regular rhythm.      Heart sounds: Normal heart sounds.   Pulmonary:      Effort: No respiratory distress.      Breath sounds: Normal breath sounds.   Abdominal:      Palpations: Abdomen is soft.      Tenderness: There is no abdominal tenderness. There is no guarding or rebound. Negative signs include Valente's sign.   Musculoskeletal:         General: No tenderness.   Skin:     General: Skin is warm.      Findings: No rash.   Neurological:      General: No focal deficit present.      Mental Status: She is alert.           LAB:  All pertinent labs reviewed and interpreted.  Labs Ordered and Resulted from Time of ED Arrival to Time of ED Departure - No data to display    RADIOLOGY:  Reviewed all pertinent imaging. Please see official radiology report.  Chest XR,  PA & LAT    (Results Pending)             Oscar Hall M.D.  Emergency Medicine  [facility] EMERGENCY ROOM  [address] [address] [phone]  Dept: [phone]       Oscar Hall MD  01/09/25 0633

## (undated) RX ORDER — FENTANYL CITRATE 50 UG/ML
INJECTION, SOLUTION INTRAMUSCULAR; INTRAVENOUS
Status: DISPENSED
Start: 2024-10-11

## (undated) RX ORDER — DIPHENHYDRAMINE HYDROCHLORIDE 50 MG/ML
INJECTION INTRAMUSCULAR; INTRAVENOUS
Status: DISPENSED
Start: 2024-10-11